# Patient Record
Sex: FEMALE | Race: WHITE | Employment: OTHER | URBAN - METROPOLITAN AREA
[De-identification: names, ages, dates, MRNs, and addresses within clinical notes are randomized per-mention and may not be internally consistent; named-entity substitution may affect disease eponyms.]

---

## 2020-01-01 ENCOUNTER — APPOINTMENT (OUTPATIENT)
Dept: GENERAL RADIOLOGY | Age: 80
End: 2020-01-01
Payer: OTHER MISCELLANEOUS

## 2020-01-01 ENCOUNTER — HOSPITAL ENCOUNTER (OUTPATIENT)
Age: 80
Discharge: HOME | End: 2020-05-08
Payer: MEDICARE

## 2020-01-01 ENCOUNTER — HOSPITAL ENCOUNTER (OUTPATIENT)
Dept: ULTRASOUND IMAGING | Age: 80
Discharge: HOME | End: 2020-05-08
Payer: MEDICARE

## 2020-01-01 ENCOUNTER — APPOINTMENT (OUTPATIENT)
Dept: ULTRASOUND IMAGING | Age: 80
End: 2020-01-01
Payer: OTHER MISCELLANEOUS

## 2020-01-01 ENCOUNTER — APPOINTMENT (OUTPATIENT)
Dept: CT IMAGING | Age: 80
End: 2020-01-01
Payer: OTHER MISCELLANEOUS

## 2020-01-01 ENCOUNTER — HOSPITAL ENCOUNTER (EMERGENCY)
Age: 80
Discharge: HOME | End: 2020-10-09
Attending: EMERGENCY MEDICINE
Payer: OTHER MISCELLANEOUS

## 2020-01-01 VITALS
RESPIRATION RATE: 16 BRPM | HEIGHT: 64 IN | OXYGEN SATURATION: 97 % | HEART RATE: 94 BPM | BODY MASS INDEX: 15.36 KG/M2 | WEIGHT: 90 LBS | SYSTOLIC BLOOD PRESSURE: 142 MMHG | DIASTOLIC BLOOD PRESSURE: 80 MMHG | TEMPERATURE: 98 F

## 2020-01-01 DIAGNOSIS — N39.0 URINARY TRACT INFECTION WITHOUT HEMATURIA, SITE UNSPECIFIED: Primary | ICD-10-CM

## 2020-01-01 DIAGNOSIS — R60.0 LOWER EXTREMITY EDEMA: ICD-10-CM
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
## 2020-01-01 LAB
ALBUMIN SERPL-MCNC: 4.3 G/DL (ref 3.5–5.2)
ALP BLD-CCNC: 121 U/L (ref 35–104)
ALT SERPL-CCNC: 13 U/L (ref 0–32)
ANION GAP SERPL CALCULATED.3IONS-SCNC: 11 MMOL/L (ref 7–16)
AST SERPL-CCNC: 30 U/L (ref 0–31)
BACTERIA: ABNORMAL /HPF
BASOPHILS ABSOLUTE: 0.04 E9/L (ref 0–0.2)
BASOPHILS RELATIVE PERCENT: 0.6 % (ref 0–2)
BILIRUB SERPL-MCNC: 0.5 MG/DL (ref 0–1.2)
BILIRUBIN URINE: NEGATIVE
BLOOD, URINE: ABNORMAL
BUN BLDV-MCNC: 20 MG/DL (ref 8–23)
CALCIUM SERPL-MCNC: 9.9 MG/DL (ref 8.6–10.2)
CHLORIDE BLD-SCNC: 102 MMOL/L (ref 98–107)
CLARITY: CLEAR
CO2: 26 MMOL/L (ref 22–29)
COLOR: YELLOW
CREAT SERPL-MCNC: 0.8 MG/DL (ref 0.5–1)
EOSINOPHILS ABSOLUTE: 0.12 E9/L (ref 0.05–0.5)
EOSINOPHILS RELATIVE PERCENT: 1.8 % (ref 0–6)
EPITHELIAL CELLS, UA: ABNORMAL /HPF
GFR AFRICAN AMERICAN: >60
GFR NON-AFRICAN AMERICAN: >60 ML/MIN/1.73
GLUCOSE BLD-MCNC: 91 MG/DL (ref 74–99)
GLUCOSE URINE: NEGATIVE MG/DL
HCT VFR BLD CALC: 38.9 % (ref 34–48)
HEMOGLOBIN: 12.4 G/DL (ref 11.5–15.5)
IMMATURE GRANULOCYTES #: 0.02 E9/L
IMMATURE GRANULOCYTES %: 0.3 % (ref 0–5)
KETONES, URINE: NEGATIVE MG/DL
LEUKOCYTE ESTERASE, URINE: ABNORMAL
LYMPHOCYTES ABSOLUTE: 1.45 E9/L (ref 1.5–4)
LYMPHOCYTES RELATIVE PERCENT: 21.9 % (ref 20–42)
MAGNESIUM: 2.3 MG/DL (ref 1.6–2.6)
MCH RBC QN AUTO: 29.9 PG (ref 26–35)
MCHC RBC AUTO-ENTMCNC: 31.9 % (ref 32–34.5)
MCV RBC AUTO: 93.7 FL (ref 80–99.9)
METER GLUCOSE: 87 MG/DL (ref 74–99)
MONOCYTES ABSOLUTE: 0.54 E9/L (ref 0.1–0.95)
MONOCYTES RELATIVE PERCENT: 8.1 % (ref 2–12)
NEUTROPHILS ABSOLUTE: 4.46 E9/L (ref 1.8–7.3)
NEUTROPHILS RELATIVE PERCENT: 67.3 % (ref 43–80)
NITRITE, URINE: POSITIVE
ORGANISM: ABNORMAL
PDW BLD-RTO: 14.6 FL (ref 11.5–15)
PH UA: 6 (ref 5–9)
PLATELET # BLD: 201 E9/L (ref 130–450)
PMV BLD AUTO: 10.5 FL (ref 7–12)
POTASSIUM SERPL-SCNC: 4.2 MMOL/L (ref 3.5–5)
PRO-BNP: 567 PG/ML (ref 0–450)
PROTEIN UA: NEGATIVE MG/DL
RBC # BLD: 4.15 E12/L (ref 3.5–5.5)
RBC UA: ABNORMAL /HPF (ref 0–2)
SODIUM BLD-SCNC: 139 MMOL/L (ref 132–146)
SPECIFIC GRAVITY UA: 1.02 (ref 1–1.03)
TOTAL PROTEIN: 7.2 G/DL (ref 6.4–8.3)
URINE CULTURE, ROUTINE: ABNORMAL
UROBILINOGEN, URINE: 0.2 E.U./DL
WBC # BLD: 6.6 E9/L (ref 4.5–11.5)
WBC UA: ABNORMAL /HPF (ref 0–5)

## 2020-01-01 PROCEDURE — 83735 ASSAY OF MAGNESIUM: CPT

## 2020-01-01 PROCEDURE — 85025 COMPLETE CBC W/AUTO DIFF WBC: CPT

## 2020-01-01 PROCEDURE — 93971 EXTREMITY STUDY: CPT

## 2020-01-01 PROCEDURE — 70450 CT HEAD/BRAIN W/O DYE: CPT

## 2020-01-01 PROCEDURE — 80053 COMPREHEN METABOLIC PANEL: CPT

## 2020-01-01 PROCEDURE — 71046 X-RAY EXAM CHEST 2 VIEWS: CPT

## 2020-01-01 PROCEDURE — 72125 CT NECK SPINE W/O DYE: CPT

## 2020-01-01 PROCEDURE — 81001 URINALYSIS AUTO W/SCOPE: CPT

## 2020-01-01 PROCEDURE — 87186 SC STD MICRODIL/AGAR DIL: CPT

## 2020-01-01 PROCEDURE — 99285 EMERGENCY DEPT VISIT HI MDM: CPT

## 2020-01-01 PROCEDURE — 6370000000 HC RX 637 (ALT 250 FOR IP): Performed by: NURSE PRACTITIONER

## 2020-01-01 PROCEDURE — 83880 ASSAY OF NATRIURETIC PEPTIDE: CPT

## 2020-01-01 PROCEDURE — 93971 EXTREMITY STUDY: CPT | Performed by: RADIOLOGY

## 2020-01-01 PROCEDURE — 87088 URINE BACTERIA CULTURE: CPT

## 2020-01-01 RX ORDER — CEPHALEXIN 500 MG/1
500 CAPSULE ORAL ONCE
Status: COMPLETED | OUTPATIENT
Start: 2020-01-01 | End: 2020-01-01

## 2020-01-01 RX ORDER — CEPHALEXIN 500 MG/1
500 CAPSULE ORAL 3 TIMES DAILY
Qty: 21 CAPSULE | Refills: 0 | Status: SHIPPED | OUTPATIENT
Start: 2020-01-01 | End: 2020-01-01

## 2020-01-01 RX ADMIN — CEPHALEXIN 500 MG: 500 CAPSULE ORAL at 18:40

## 2020-01-30 ENCOUNTER — HOSPITAL ENCOUNTER (OUTPATIENT)
Age: 80
Discharge: HOME | End: 2020-02-01
Payer: MEDICARE

## 2020-01-30 PROCEDURE — 88311 DECALCIFY TISSUE: CPT

## 2020-01-30 PROCEDURE — 88304 TISSUE EXAM BY PATHOLOGIST: CPT
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
## 2021-01-01 ENCOUNTER — HOSPITAL ENCOUNTER (EMERGENCY)
Age: 81
Discharge: NURSING, REHAB OR LONG-TERM CARE | End: 2021-03-30
Attending: EMERGENCY MEDICINE
Payer: MEDICARE

## 2021-01-01 ENCOUNTER — HOSPITAL ENCOUNTER (INPATIENT)
Age: 81
LOS: 1 days | Discharge: HOME | DRG: 689 | End: 2021-03-30
Attending: EMERGENCY MEDICINE | Admitting: INTERNAL MEDICINE
Payer: MEDICARE

## 2021-01-01 ENCOUNTER — APPOINTMENT (OUTPATIENT)
Dept: CT IMAGING | Age: 81
DRG: 689 | End: 2021-01-01
Payer: MEDICARE

## 2021-01-01 ENCOUNTER — APPOINTMENT (OUTPATIENT)
Dept: CT IMAGING | Age: 81
End: 2021-01-01
Payer: MEDICARE

## 2021-01-01 ENCOUNTER — APPOINTMENT (OUTPATIENT)
Dept: GENERAL RADIOLOGY | Age: 81
DRG: 689 | End: 2021-01-01
Payer: MEDICARE

## 2021-01-01 ENCOUNTER — HOSPITAL ENCOUNTER (EMERGENCY)
Age: 81
Discharge: HOME | End: 2021-01-28
Payer: MEDICARE

## 2021-01-01 VITALS
OXYGEN SATURATION: 100 % | HEART RATE: 99 BPM | BODY MASS INDEX: 17.48 KG/M2 | WEIGHT: 95 LBS | DIASTOLIC BLOOD PRESSURE: 81 MMHG | HEIGHT: 62 IN | RESPIRATION RATE: 18 BRPM | SYSTOLIC BLOOD PRESSURE: 154 MMHG

## 2021-01-01 VITALS
HEIGHT: 62 IN | DIASTOLIC BLOOD PRESSURE: 82 MMHG | TEMPERATURE: 96.8 F | BODY MASS INDEX: 16.93 KG/M2 | RESPIRATION RATE: 16 BRPM | HEART RATE: 100 BPM | OXYGEN SATURATION: 95 % | WEIGHT: 92 LBS | SYSTOLIC BLOOD PRESSURE: 151 MMHG

## 2021-01-01 VITALS
SYSTOLIC BLOOD PRESSURE: 118 MMHG | TEMPERATURE: 97.1 F | WEIGHT: 90 LBS | RESPIRATION RATE: 16 BRPM | DIASTOLIC BLOOD PRESSURE: 84 MMHG | OXYGEN SATURATION: 98 % | HEART RATE: 84 BPM | HEIGHT: 63 IN | BODY MASS INDEX: 15.95 KG/M2

## 2021-01-01 DIAGNOSIS — R53.1 GENERAL WEAKNESS: ICD-10-CM

## 2021-01-01 DIAGNOSIS — N30.00 ACUTE CYSTITIS WITHOUT HEMATURIA: ICD-10-CM
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
## 2021-01-01 DIAGNOSIS — S09.90XA MINOR HEAD INJURY, INITIAL ENCOUNTER: ICD-10-CM

## 2021-01-01 DIAGNOSIS — S01.81XA LACERATION OF FOREHEAD, INITIAL ENCOUNTER: Primary | ICD-10-CM

## 2021-01-01 DIAGNOSIS — R62.7 FAILURE TO THRIVE IN ADULT: Primary | ICD-10-CM
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
## 2021-01-01 LAB
ALBUMIN SERPL-MCNC: 3.9 G/DL (ref 3.5–5.2)
ALBUMIN SERPL-MCNC: 4 G/DL (ref 3.5–5.2)
ALBUMIN SERPL-MCNC: 4.3 G/DL (ref 3.5–5.2)
ALP BLD-CCNC: 118 U/L (ref 35–104)
ALP BLD-CCNC: 119 U/L (ref 35–104)
ALP BLD-CCNC: 123 U/L (ref 35–104)
ALT SERPL-CCNC: 15 U/L (ref 0–32)
ALT SERPL-CCNC: 17 U/L (ref 0–32)
ALT SERPL-CCNC: 18 U/L (ref 0–32)
ANION GAP SERPL CALCULATED.3IONS-SCNC: 12 MMOL/L (ref 7–16)
ANION GAP SERPL CALCULATED.3IONS-SCNC: 16 MMOL/L (ref 7–16)
ANION GAP SERPL CALCULATED.3IONS-SCNC: 9 MMOL/L (ref 7–16)
AST SERPL-CCNC: 27 U/L (ref 0–31)
AST SERPL-CCNC: 27 U/L (ref 0–31)
AST SERPL-CCNC: 31 U/L (ref 0–31)
BACTERIA: ABNORMAL /HPF
BASOPHILS ABSOLUTE: 0.04 E9/L (ref 0–0.2)
BASOPHILS ABSOLUTE: 0.05 E9/L (ref 0–0.2)
BASOPHILS RELATIVE PERCENT: 0.3 % (ref 0–2)
BASOPHILS RELATIVE PERCENT: 0.6 % (ref 0–2)
BILIRUB SERPL-MCNC: 0.2 MG/DL (ref 0–1.2)
BILIRUB SERPL-MCNC: 0.4 MG/DL (ref 0–1.2)
BILIRUB SERPL-MCNC: 0.4 MG/DL (ref 0–1.2)
BILIRUBIN URINE: NEGATIVE
BLOOD CULTURE, ROUTINE: NORMAL
BLOOD, URINE: ABNORMAL
BUN BLDV-MCNC: 15 MG/DL (ref 8–23)
BUN BLDV-MCNC: 19 MG/DL (ref 8–23)
BUN BLDV-MCNC: 22 MG/DL (ref 8–23)
CALCIUM SERPL-MCNC: 10.3 MG/DL (ref 8.6–10.2)
CALCIUM SERPL-MCNC: 9.3 MG/DL (ref 8.6–10.2)
CALCIUM SERPL-MCNC: 9.9 MG/DL (ref 8.6–10.2)
CHLORIDE BLD-SCNC: 97 MMOL/L (ref 98–107)
CHLORIDE BLD-SCNC: 99 MMOL/L (ref 98–107)
CHLORIDE BLD-SCNC: 99 MMOL/L (ref 98–107)
CLARITY: CLEAR
CO2: 22 MMOL/L (ref 22–29)
CO2: 22 MMOL/L (ref 22–29)
CO2: 28 MMOL/L (ref 22–29)
COLOR: YELLOW
CREAT SERPL-MCNC: 0.6 MG/DL (ref 0.5–1)
CREAT SERPL-MCNC: 0.8 MG/DL (ref 0.5–1)
CREAT SERPL-MCNC: 1.1 MG/DL (ref 0.5–1)
CULTURE, BLOOD 2: NORMAL
EKG ATRIAL RATE: 103 BPM
EKG P AXIS: 84 DEGREES
EKG P-R INTERVAL: 174 MS
EKG Q-T INTERVAL: 352 MS
EKG QRS DURATION: 60 MS
EKG QTC CALCULATION (BAZETT): 461 MS
EKG R AXIS: 11 DEGREES
EKG T AXIS: 78 DEGREES
EKG VENTRICULAR RATE: 103 BPM
EOSINOPHILS ABSOLUTE: 0.07 E9/L (ref 0.05–0.5)
EOSINOPHILS ABSOLUTE: 0.1 E9/L (ref 0.05–0.5)
EOSINOPHILS RELATIVE PERCENT: 0.5 % (ref 0–6)
EOSINOPHILS RELATIVE PERCENT: 1.1 % (ref 0–6)
EPITHELIAL CELLS, UA: ABNORMAL /HPF
GFR AFRICAN AMERICAN: 57
GFR AFRICAN AMERICAN: >60
GFR AFRICAN AMERICAN: >60
GFR NON-AFRICAN AMERICAN: 47 ML/MIN/1.73
GFR NON-AFRICAN AMERICAN: >60 ML/MIN/1.73
GFR NON-AFRICAN AMERICAN: >60 ML/MIN/1.73
GLUCOSE BLD-MCNC: 100 MG/DL (ref 74–99)
GLUCOSE BLD-MCNC: 106 MG/DL (ref 74–99)
GLUCOSE BLD-MCNC: 143 MG/DL (ref 74–99)
GLUCOSE URINE: NEGATIVE MG/DL
HCT VFR BLD CALC: 42 % (ref 34–48)
HCT VFR BLD CALC: 42.4 % (ref 34–48)
HCT VFR BLD CALC: 45.9 % (ref 34–48)
HEMOGLOBIN: 13.4 G/DL (ref 11.5–15.5)
HEMOGLOBIN: 13.4 G/DL (ref 11.5–15.5)
HEMOGLOBIN: 14.3 G/DL (ref 11.5–15.5)
IMMATURE GRANULOCYTES #: 0.03 E9/L
IMMATURE GRANULOCYTES #: 0.06 E9/L
IMMATURE GRANULOCYTES %: 0.3 % (ref 0–5)
IMMATURE GRANULOCYTES %: 0.4 % (ref 0–5)
KETONES, URINE: NEGATIVE MG/DL
LACTIC ACID, SEPSIS: 1.4 MMOL/L (ref 0.5–1.9)
LEUKOCYTE ESTERASE, URINE: ABNORMAL
LYMPHOCYTES ABSOLUTE: 0.75 E9/L (ref 1.5–4)
LYMPHOCYTES ABSOLUTE: 2.28 E9/L (ref 1.5–4)
LYMPHOCYTES RELATIVE PERCENT: 25.3 % (ref 20–42)
LYMPHOCYTES RELATIVE PERCENT: 5.5 % (ref 20–42)
MCH RBC QN AUTO: 29.4 PG (ref 26–35)
MCH RBC QN AUTO: 29.5 PG (ref 26–35)
MCH RBC QN AUTO: 29.9 PG (ref 26–35)
MCHC RBC AUTO-ENTMCNC: 31.2 % (ref 32–34.5)
MCHC RBC AUTO-ENTMCNC: 31.6 % (ref 32–34.5)
MCHC RBC AUTO-ENTMCNC: 31.9 % (ref 32–34.5)
MCV RBC AUTO: 93.4 FL (ref 80–99.9)
MCV RBC AUTO: 93.8 FL (ref 80–99.9)
MCV RBC AUTO: 94.4 FL (ref 80–99.9)
MONOCYTES ABSOLUTE: 0.77 E9/L (ref 0.1–0.95)
MONOCYTES ABSOLUTE: 0.83 E9/L (ref 0.1–0.95)
MONOCYTES RELATIVE PERCENT: 5.7 % (ref 2–12)
MONOCYTES RELATIVE PERCENT: 9.2 % (ref 2–12)
NEUTROPHILS ABSOLUTE: 11.91 E9/L (ref 1.8–7.3)
NEUTROPHILS ABSOLUTE: 5.71 E9/L (ref 1.8–7.3)
NEUTROPHILS RELATIVE PERCENT: 63.5 % (ref 43–80)
NEUTROPHILS RELATIVE PERCENT: 87.6 % (ref 43–80)
NITRITE, URINE: NEGATIVE
PDW BLD-RTO: 14.3 FL (ref 11.5–15)
PDW BLD-RTO: 14.4 FL (ref 11.5–15)
PDW BLD-RTO: 14.4 FL (ref 11.5–15)
PH UA: 6.5 (ref 5–9)
PLATELET # BLD: 258 E9/L (ref 130–450)
PLATELET # BLD: 260 E9/L (ref 130–450)
PLATELET # BLD: 266 E9/L (ref 130–450)
PMV BLD AUTO: 10.5 FL (ref 7–12)
PMV BLD AUTO: 9.4 FL (ref 7–12)
PMV BLD AUTO: 9.5 FL (ref 7–12)
POTASSIUM REFLEX MAGNESIUM: 4.9 MMOL/L (ref 3.5–5)
POTASSIUM REFLEX MAGNESIUM: 5 MMOL/L (ref 3.5–5)
POTASSIUM SERPL-SCNC: 4.8 MMOL/L (ref 3.5–5)
PROTEIN UA: NEGATIVE MG/DL
RBC # BLD: 4.48 E12/L (ref 3.5–5.5)
RBC # BLD: 4.54 E12/L (ref 3.5–5.5)
RBC # BLD: 4.86 E12/L (ref 3.5–5.5)
RBC UA: ABNORMAL /HPF (ref 0–2)
RENAL EPITHELIAL, UA: ABNORMAL /HPF
SARS-COV-2, NAAT: NOT DETECTED
SARS-COV-2, NAAT: NOT DETECTED
SODIUM BLD-SCNC: 133 MMOL/L (ref 132–146)
SODIUM BLD-SCNC: 134 MMOL/L (ref 132–146)
SODIUM BLD-SCNC: 137 MMOL/L (ref 132–146)
SPECIFIC GRAVITY UA: >=1.03 (ref 1–1.03)
TOTAL CK: 305 U/L (ref 20–180)
TOTAL PROTEIN: 7 G/DL (ref 6.4–8.3)
TOTAL PROTEIN: 7.5 G/DL (ref 6.4–8.3)
TOTAL PROTEIN: 7.7 G/DL (ref 6.4–8.3)
TROPONIN: <0.01 NG/ML (ref 0–0.03)
UROBILINOGEN, URINE: 0.2 E.U./DL
WBC # BLD: 13.6 E9/L (ref 4.5–11.5)
WBC # BLD: 9 E9/L (ref 4.5–11.5)
WBC # BLD: 9.2 E9/L (ref 4.5–11.5)
WBC UA: ABNORMAL /HPF (ref 0–5)

## 2021-01-01 PROCEDURE — 2580000003 HC RX 258: Performed by: STUDENT IN AN ORGANIZED HEALTH CARE EDUCATION/TRAINING PROGRAM

## 2021-01-01 PROCEDURE — 6360000002 HC RX W HCPCS: Performed by: NURSE PRACTITIONER

## 2021-01-01 PROCEDURE — 90471 IMMUNIZATION ADMIN: CPT | Performed by: NURSE PRACTITIONER

## 2021-01-01 PROCEDURE — 82550 ASSAY OF CK (CPK): CPT

## 2021-01-01 PROCEDURE — G0008 ADMIN INFLUENZA VIRUS VAC: HCPCS | Performed by: INTERNAL MEDICINE

## 2021-01-01 PROCEDURE — 80053 COMPREHEN METABOLIC PANEL: CPT

## 2021-01-01 PROCEDURE — 97161 PT EVAL LOW COMPLEX 20 MIN: CPT | Performed by: PHYSICAL THERAPIST

## 2021-01-01 PROCEDURE — 97530 THERAPEUTIC ACTIVITIES: CPT

## 2021-01-01 PROCEDURE — 99283 EMERGENCY DEPT VISIT LOW MDM: CPT

## 2021-01-01 PROCEDURE — 6370000000 HC RX 637 (ALT 250 FOR IP): Performed by: NEUROMUSCULOSKELETAL MEDICINE & OMM

## 2021-01-01 PROCEDURE — 93005 ELECTROCARDIOGRAM TRACING: CPT | Performed by: STUDENT IN AN ORGANIZED HEALTH CARE EDUCATION/TRAINING PROGRAM

## 2021-01-01 PROCEDURE — 90715 TDAP VACCINE 7 YRS/> IM: CPT | Performed by: NURSE PRACTITIONER

## 2021-01-01 PROCEDURE — 85027 COMPLETE CBC AUTOMATED: CPT

## 2021-01-01 PROCEDURE — 36415 COLL VENOUS BLD VENIPUNCTURE: CPT

## 2021-01-01 PROCEDURE — 87635 SARS-COV-2 COVID-19 AMP PRB: CPT

## 2021-01-01 PROCEDURE — 70450 CT HEAD/BRAIN W/O DYE: CPT

## 2021-01-01 PROCEDURE — 81001 URINALYSIS AUTO W/SCOPE: CPT

## 2021-01-01 PROCEDURE — 6370000000 HC RX 637 (ALT 250 FOR IP): Performed by: STUDENT IN AN ORGANIZED HEALTH CARE EDUCATION/TRAINING PROGRAM

## 2021-01-01 PROCEDURE — 97165 OT EVAL LOW COMPLEX 30 MIN: CPT

## 2021-01-01 PROCEDURE — 6360000002 HC RX W HCPCS: Performed by: INTERNAL MEDICINE

## 2021-01-01 PROCEDURE — 99231 SBSQ HOSP IP/OBS SF/LOW 25: CPT | Performed by: NURSE PRACTITIONER

## 2021-01-01 PROCEDURE — G0378 HOSPITAL OBSERVATION PER HR: HCPCS

## 2021-01-01 PROCEDURE — 99221 1ST HOSP IP/OBS SF/LOW 40: CPT | Performed by: PHYSICIAN ASSISTANT

## 2021-01-01 PROCEDURE — 2500000003 HC RX 250 WO HCPCS: Performed by: NURSE PRACTITIONER

## 2021-01-01 PROCEDURE — 2580000003 HC RX 258: Performed by: NEUROMUSCULOSKELETAL MEDICINE & OMM

## 2021-01-01 PROCEDURE — 73521 X-RAY EXAM HIPS BI 2 VIEWS: CPT

## 2021-01-01 PROCEDURE — 1200000000 HC SEMI PRIVATE

## 2021-01-01 PROCEDURE — 99232 SBSQ HOSP IP/OBS MODERATE 35: CPT | Performed by: NURSE PRACTITIONER

## 2021-01-01 PROCEDURE — 83605 ASSAY OF LACTIC ACID: CPT

## 2021-01-01 PROCEDURE — 96376 TX/PRO/DX INJ SAME DRUG ADON: CPT

## 2021-01-01 PROCEDURE — 6360000002 HC RX W HCPCS: Performed by: NEUROMUSCULOSKELETAL MEDICINE & OMM

## 2021-01-01 PROCEDURE — 97530 THERAPEUTIC ACTIVITIES: CPT | Performed by: PHYSICAL THERAPIST

## 2021-01-01 PROCEDURE — 85025 COMPLETE CBC W/AUTO DIFF WBC: CPT

## 2021-01-01 PROCEDURE — 99284 EMERGENCY DEPT VISIT MOD MDM: CPT

## 2021-01-01 PROCEDURE — 84484 ASSAY OF TROPONIN QUANT: CPT

## 2021-01-01 PROCEDURE — 90694 VACC AIIV4 NO PRSRV 0.5ML IM: CPT | Performed by: INTERNAL MEDICINE

## 2021-01-01 PROCEDURE — 6360000002 HC RX W HCPCS: Performed by: STUDENT IN AN ORGANIZED HEALTH CARE EDUCATION/TRAINING PROGRAM

## 2021-01-01 PROCEDURE — 96374 THER/PROPH/DIAG INJ IV PUSH: CPT

## 2021-01-01 PROCEDURE — 97535 SELF CARE MNGMENT TRAINING: CPT

## 2021-01-01 PROCEDURE — 87040 BLOOD CULTURE FOR BACTERIA: CPT

## 2021-01-01 PROCEDURE — 12013 RPR F/E/E/N/L/M 2.6-5.0 CM: CPT

## 2021-01-01 PROCEDURE — 51701 INSERT BLADDER CATHETER: CPT

## 2021-01-01 PROCEDURE — 93010 ELECTROCARDIOGRAM REPORT: CPT | Performed by: INTERNAL MEDICINE

## 2021-01-01 RX ORDER — SODIUM CHLORIDE 0.9 % (FLUSH) 0.9 %
10 SYRINGE (ML) INJECTION EVERY 12 HOURS SCHEDULED
Status: DISCONTINUED | OUTPATIENT
Start: 2021-01-01 | End: 2021-01-01 | Stop reason: HOSPADM

## 2021-01-01 RX ORDER — ACETAMINOPHEN 325 MG/1
650 TABLET ORAL EVERY 6 HOURS PRN
Status: DISCONTINUED | OUTPATIENT
Start: 2021-01-01 | End: 2021-01-01 | Stop reason: HOSPADM

## 2021-01-01 RX ORDER — ALPRAZOLAM 0.25 MG/1
0.25 TABLET ORAL 2 TIMES DAILY
Status: DISCONTINUED | OUTPATIENT
Start: 2021-01-01 | End: 2021-01-01 | Stop reason: HOSPADM

## 2021-01-01 RX ORDER — LIDOCAINE HYDROCHLORIDE 10 MG/ML
5 INJECTION, SOLUTION INFILTRATION; PERINEURAL ONCE
Status: COMPLETED | OUTPATIENT
Start: 2021-01-01 | End: 2021-01-01

## 2021-01-01 RX ORDER — CEPHALEXIN 500 MG/1
500 CAPSULE ORAL 3 TIMES DAILY
Qty: 15 CAPSULE | Refills: 0 | Status: SHIPPED | OUTPATIENT
Start: 2021-01-01 | End: 2021-01-01

## 2021-01-01 RX ORDER — PROMETHAZINE HYDROCHLORIDE 25 MG/1
12.5 TABLET ORAL EVERY 6 HOURS PRN
Status: DISCONTINUED | OUTPATIENT
Start: 2021-01-01 | End: 2021-01-01 | Stop reason: HOSPADM

## 2021-01-01 RX ORDER — ACETAMINOPHEN 650 MG/1
650 SUPPOSITORY RECTAL EVERY 6 HOURS PRN
Status: DISCONTINUED | OUTPATIENT
Start: 2021-01-01 | End: 2021-01-01 | Stop reason: HOSPADM

## 2021-01-01 RX ORDER — SODIUM CHLORIDE 0.9 % (FLUSH) 0.9 %
10 SYRINGE (ML) INJECTION PRN
Status: DISCONTINUED | OUTPATIENT
Start: 2021-01-01 | End: 2021-01-01 | Stop reason: HOSPADM

## 2021-01-01 RX ORDER — ACETAMINOPHEN 500 MG
1000 TABLET ORAL EVERY 6 HOURS PRN
Qty: 20 TABLET | Refills: 0 | Status: SHIPPED | OUTPATIENT
Start: 2021-01-01 | End: 2021-01-01

## 2021-01-01 RX ORDER — ALPRAZOLAM 0.25 MG/1
0.25 TABLET ORAL 2 TIMES DAILY
COMMUNITY

## 2021-01-01 RX ORDER — 0.9 % SODIUM CHLORIDE 0.9 %
500 INTRAVENOUS SOLUTION INTRAVENOUS ONCE
Status: COMPLETED | OUTPATIENT
Start: 2021-01-01 | End: 2021-01-01

## 2021-01-01 RX ORDER — ACETAMINOPHEN 500 MG
1000 TABLET ORAL EVERY 6 HOURS PRN
Qty: 20 TABLET | Refills: 0 | Status: SHIPPED | OUTPATIENT
Start: 2021-01-01 | End: 2021-01-01 | Stop reason: HOSPADM

## 2021-01-01 RX ORDER — PRAVASTATIN SODIUM 20 MG
10 TABLET ORAL DAILY
Status: DISCONTINUED | OUTPATIENT
Start: 2021-01-01 | End: 2021-01-01 | Stop reason: HOSPADM

## 2021-01-01 RX ORDER — SODIUM CHLORIDE 9 MG/ML
25 INJECTION, SOLUTION INTRAVENOUS PRN
Status: DISCONTINUED | OUTPATIENT
Start: 2021-01-01 | End: 2021-01-01 | Stop reason: HOSPADM

## 2021-01-01 RX ORDER — DIAPER,BRIEF,INFANT-TODD,DISP
EACH MISCELLANEOUS
Status: DISCONTINUED
Start: 2021-01-01 | End: 2021-01-01 | Stop reason: HOSPADM

## 2021-01-01 RX ORDER — BACITRACIN ZINC AND POLYMYXIN B SULFATE 500; 1000 [USP'U]/G; [USP'U]/G
OINTMENT TOPICAL
Qty: 30 G | Refills: 0 | Status: SHIPPED | OUTPATIENT
Start: 2021-01-01 | End: 2021-01-01

## 2021-01-01 RX ORDER — SODIUM CHLORIDE 9 MG/ML
INJECTION, SOLUTION INTRAVENOUS CONTINUOUS
Status: DISCONTINUED | OUTPATIENT
Start: 2021-01-01 | End: 2021-01-01 | Stop reason: HOSPADM

## 2021-01-01 RX ORDER — ONDANSETRON 2 MG/ML
4 INJECTION INTRAMUSCULAR; INTRAVENOUS EVERY 6 HOURS PRN
Status: DISCONTINUED | OUTPATIENT
Start: 2021-01-01 | End: 2021-01-01 | Stop reason: HOSPADM

## 2021-01-01 RX ORDER — POLYETHYLENE GLYCOL 3350 17 G/17G
17 POWDER, FOR SOLUTION ORAL DAILY PRN
Status: DISCONTINUED | OUTPATIENT
Start: 2021-01-01 | End: 2021-01-01 | Stop reason: HOSPADM

## 2021-01-01 RX ORDER — 0.9 % SODIUM CHLORIDE 0.9 %
1000 INTRAVENOUS SOLUTION INTRAVENOUS ONCE
Status: COMPLETED | OUTPATIENT
Start: 2021-01-01 | End: 2021-01-01

## 2021-01-01 RX ADMIN — PRAVASTATIN SODIUM 10 MG: 20 TABLET ORAL at 09:38

## 2021-01-01 RX ADMIN — A/SINGAPORE/GP1908/2015 IVR-180 (AN A/MICHIGAN/45/2015 (H1N1)PDM09-LIKE VIRUS, A/HONG KONG/4801/2014, NYMC X-263B (H3N2) (AN A/HONG KONG/4801/2014-LIKE VIRUS), AND B/BRISBANE/60/2008, WILD TYPE (A B/BRISBANE/60/2008-LIKE VIRUS) 0.5 ML: 15; 15; 15 INJECTION, SUSPENSION INTRAMUSCULAR at 11:38

## 2021-01-01 RX ADMIN — LIDOCAINE HYDROCHLORIDE 5 ML: 10 INJECTION, SOLUTION INFILTRATION; PERINEURAL at 14:37

## 2021-01-01 RX ADMIN — ALPRAZOLAM 0.25 MG: 0.25 TABLET ORAL at 21:59

## 2021-01-01 RX ADMIN — SODIUM CHLORIDE 500 ML: 9 INJECTION, SOLUTION INTRAVENOUS at 09:40

## 2021-01-01 RX ADMIN — Medication 10 ML: at 09:33

## 2021-01-01 RX ADMIN — TETANUS TOXOID, REDUCED DIPHTHERIA TOXOID AND ACELLULAR PERTUSSIS VACCINE, ADSORBED 0.5 ML: 5; 2.5; 8; 8; 2.5 SUSPENSION INTRAMUSCULAR at 14:36

## 2021-01-01 RX ADMIN — ALPRAZOLAM 0.25 MG: 0.25 TABLET ORAL at 09:33

## 2021-01-01 RX ADMIN — SODIUM CHLORIDE 1000 ML: 9 INJECTION, SOLUTION INTRAVENOUS at 16:03

## 2021-01-01 RX ADMIN — CEFTRIAXONE SODIUM 1000 MG: 1 INJECTION, POWDER, FOR SOLUTION INTRAMUSCULAR; INTRAVENOUS at 16:11

## 2021-01-01 RX ADMIN — ALPRAZOLAM 0.25 MG: 0.25 TABLET ORAL at 09:21

## 2021-01-01 RX ADMIN — PRAVASTATIN SODIUM 10 MG: 20 TABLET ORAL at 09:21

## 2021-01-01 RX ADMIN — ALPRAZOLAM 0.25 MG: 0.25 TABLET ORAL at 20:50

## 2021-01-01 RX ADMIN — Medication 10 ML: at 23:03

## 2021-01-01 RX ADMIN — SODIUM CHLORIDE: 9 INJECTION, SOLUTION INTRAVENOUS at 23:03

## 2021-01-01 RX ADMIN — CEFTRIAXONE SODIUM 1000 MG: 1 INJECTION, POWDER, FOR SOLUTION INTRAMUSCULAR; INTRAVENOUS at 16:57

## 2021-01-01 RX ADMIN — ACETAMINOPHEN 650 MG: 325 TABLET ORAL at 16:43

## 2021-01-01 RX ADMIN — ALPRAZOLAM 0.25 MG: 0.25 TABLET ORAL at 09:38

## 2021-01-01 RX ADMIN — PRAVASTATIN SODIUM 10 MG: 20 TABLET ORAL at 09:33

## 2021-01-01 RX ADMIN — ACETAMINOPHEN 650 MG: 325 TABLET ORAL at 06:49

## 2021-01-01 RX ADMIN — ACETAMINOPHEN 650 MG: 325 TABLET ORAL at 10:03

## 2021-01-01 RX ADMIN — Medication 10 ML: at 22:00

## 2021-01-01 RX ADMIN — ACETAMINOPHEN 650 MG: 325 TABLET ORAL at 17:02

## 2021-01-01 ASSESSMENT — PAIN SCALES - GENERAL
PAINLEVEL_OUTOF10: 0
PAINLEVEL_OUTOF10: 9
PAINLEVEL_OUTOF10: 7
PAINLEVEL_OUTOF10: 8
PAINLEVEL_OUTOF10: 0
PAINLEVEL_OUTOF10: 3
PAINLEVEL_OUTOF10: 8

## 2021-01-01 ASSESSMENT — PAIN DESCRIPTION - DESCRIPTORS
DESCRIPTORS: NUMBNESS;ACHING;DULL
DESCRIPTORS: NUMBNESS

## 2021-01-01 ASSESSMENT — PAIN DESCRIPTION - LOCATION
LOCATION: BACK
LOCATION: HEAD

## 2021-01-01 ASSESSMENT — ENCOUNTER SYMPTOMS
EYE PAIN: 0
COUGH: 0
SHORTNESS OF BREATH: 0
SORE THROAT: 0
DIARRHEA: 0
WHEEZING: 0
EYE DISCHARGE: 0
ABDOMINAL DISTENTION: 0
ABDOMINAL PAIN: 0
NAUSEA: 0
SHORTNESS OF BREATH: 0
BACK PAIN: 0
WHEEZING: 0
ABDOMINAL DISTENTION: 0
VOMITING: 0
SINUS PRESSURE: 0
BACK PAIN: 0
COUGH: 0
EYE REDNESS: 0

## 2021-01-01 ASSESSMENT — PAIN DESCRIPTION - PAIN TYPE
TYPE: ACUTE PAIN
TYPE: CHRONIC PAIN
TYPE: CHRONIC PAIN

## 2021-01-01 ASSESSMENT — PAIN DESCRIPTION - ORIENTATION
ORIENTATION: LEFT;RIGHT
ORIENTATION: RIGHT;LEFT

## 2021-01-01 ASSESSMENT — PAIN DESCRIPTION - FREQUENCY: FREQUENCY: INTERMITTENT

## 2021-01-28 NOTE — ED NOTES
Assisted with sutures, cleansed, applied bacitracin, non ad gauze to forehead.       Polly Hanley  01/28/21 1752
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
## 2021-03-27 PROBLEM — N39.0 UTI (URINARY TRACT INFECTION): Status: ACTIVE | Noted: 2021-01-01
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
## 2021-03-27 NOTE — ED NOTES
PT'S GRAND-DAUGHTER MARCIO LARSON [phone] LIVES WITH PT AND CARES FOR HER NEEDS.      PATRICIA Kelly  03/27/21 5182

## 2021-03-27 NOTE — ED PROVIDER NOTES
[facility] Name: Jose Heredia  MRN: [id number]  [place] 1940  Date of evaluation: 3/27/2021      CHIEF COMPLAINT       Chief Complaint   Patient presents with    Fall     hx of Polio, family left for [place] yesterday, she fell at 0600 this morning and hit her button for EMS to arrive d/t her being too weak to get up. She states she has been having numbness in her legs for the past couple of months. -loc -thinners        HPI  Jose Heredia is a 78 y.o. female with past medical history of polio and hyperlipidemia presents for weakness at home. She states that she felt weak and fell down from standing. Did not lose consciousness. Just that felt like her weakness got weak and she fell down. She could not get up. Called EMS for assistance. Usually uses a walker to ambulate. She is complaining of leg weakness. No alleviating exacerbating factors. States is never happened before. Admits to dysuria. She denies any medication measures alleviate the symptoms. Denies any recent fevers, chills, nausea, vomiting, chest pain, shortness of breath, abdominal pain, flank pain, dysuria, hematuria, diarrhea, constipation, new rashes or sores. Except as noted above the remainder of the review of systems was reviewed and negative. Review of Systems   Constitutional: Negative for chills and fever. HENT: Negative for ear pain, sinus pressure and sore throat. Eyes: Negative for pain, discharge and redness. Respiratory: Negative for cough, shortness of breath and wheezing. Cardiovascular: Negative for chest pain. Gastrointestinal: Negative for abdominal distention, diarrhea, nausea and vomiting. Genitourinary: Positive for dysuria. Negative for frequency. Musculoskeletal: Negative for arthralgias and back pain. Skin: Negative for rash and wound. Neurological: Positive for weakness. Negative for dizziness and headaches.    Hematological: Negative for adenopathy. Does not bruise/bleed easily. Psychiatric/Behavioral: Negative for agitation. All other systems reviewed and are negative. Physical Exam  Vitals signs and nursing note reviewed. Constitutional:       General: She is not in acute distress. Appearance: Normal appearance. She is well-developed. She is not ill-appearing or diaphoretic. Comments: Thin, pleasant, in no acute separate   HENT:      Head: Normocephalic and atraumatic. Eyes:      Extraocular Movements: Extraocular movements intact. Pupils: Pupils are equal, round, and reactive to light. Neck:      Musculoskeletal: Normal range of motion. Cardiovascular:      Rate and Rhythm: Normal rate and regular rhythm. Pulses: Normal pulses. Heart sounds: Normal heart sounds. No murmur. No friction rub. No gallop. Pulmonary:      Effort: Pulmonary effort is normal. No respiratory distress. Breath sounds: Normal breath sounds. No stridor. No wheezing, rhonchi or rales. Chest:      Chest wall: No tenderness. Abdominal:      General: Abdomen is flat. Bowel sounds are normal.      Palpations: Abdomen is soft. Tenderness: There is no abdominal tenderness. There is no right CVA tenderness, left CVA tenderness, guarding or rebound. Musculoskeletal: Normal range of motion. Skin:     General: Skin is warm and dry. Capillary Refill: Capillary refill takes less than 2 seconds. Neurological:      General: No focal deficit present. Mental Status: She is alert and oriented to person, place, and time. Cranial Nerves: No cranial nerve deficit. Sensory: No sensory deficit.       Coordination: Coordination normal.   Psychiatric:         Mood and Affect: Mood normal.          Procedures     MDM  Number of Diagnoses or Management Options  Acute cystitis without hematuria  Fall, initial encounter  General weakness  Diagnosis management comments: 13-year-old female with a past medical history of polio on hospice for failure to thrive presents with fall and weakness at home. Patient states that she just collapsed because her legs were weak. And could not pick herself up off the floor. Vitals within normal limits. On physical exam patient is no acute distress, speaking full sounds, alert and oriented x 3. Diagnostic labs and imaging interpreted and reviewed. Patient had no acute findings other than a urinary tract infection possibly causing her weakness. She was given Rocephin in the department. There has been issues with patient's disposition. Nobody is at home to take care of the patient at this time whether it be family or her hospice center. I believe the patient is not safe to go home by herself without care. Spoke with multiple family members as well as [facility] with the plan being patient received treatment for her urinary tract infection, hospice will resend their hospice care, and the patient to be picked up by her granddaughter in the morning after treatment. ED Course as of Mar 29 1532   Sat Mar 27, 2021   186 47-year-old female with no pertinent past medical history presents with weakness and fall at home. She states that she uses a walker at times to ambulate but only for safety. States that today her legs got weak and she just collapsed on the ground. Did not fall forward or backwards or hit her head. States that she felt too weak to get up. No alleviating exacerbating factors. Is not complaining of any pain of her legs. But was unable to stand up and had to call EMS for assistance. States that her  just passed away last month after very  for 79 years. [JV]   1401 Social work states that the patient has a lifting granddaughter who is not there today but will be. Plus has daily home health aides as she is on hospice. In addition she will be going to [place] with her family in the next couple days.     [JV]   1848 Spoke with patient's daughter-in-law via palpation. Follows with [facility]. States that they will not be able to give the patient 24-hour care at this time. [JV]   1628 I called [facility]. Will be calling back about patient disposition. [JV]   8289 Spoke with [facility]. They state that they will rescinded the hospice care and patient can be admitted to the hospital on observation for urinary tract infection. A call back will be made to Dr. Vikki Avitia for admission for patient. [JV]   1752 Patient signed out to Dr. Raymundo Sanabria For patient's final disposition to be admitted to the hospital for observation for urinary tract infection after her hospice care is rescinded. [JV]   [place] 36 Consulted with Dr. Vikki Avitia, covering for Dr. Williams Pichardo who accepted for admission. [KG]      ED Course User Index  [JV] Jenny Childs MD  [KG] Makayla Hussein, DO             --------------------------------------------- PAST HISTORY ---------------------------------------------  Past Medical History:  has a past medical history of Hyperlipidemia and Polio. Past Surgical History:  has no past surgical history on file. Social History:  reports that she has been smoking. She has never used smokeless tobacco. She reports that she does not drink alcohol or use drugs. Family History: family history is not on file. The patients home medications have been reviewed. Allergies: Patient has no known allergies.     -------------------------------------------------- RESULTS -------------------------------------------------    LABS:  Results for orders placed or performed during the hospital encounter of 03/27/21   COVID-19, Rapid    Specimen: Nasopharyngeal Swab   Result Value Ref Range    SARS-CoV-2, NAAT Not Detected Not Detected   Culture, Blood 1    Specimen: Blood   Result Value Ref Range    Blood Culture, Routine 24 Hours no growth    Culture, Blood 2    Specimen: Blood   Result Value Ref Range    Culture, Blood 2 24 Hours no growth    CBC Auto Differential   Result Value Ref Range    WBC 13.6 (H) 4.5 - 11.5 E9/L    RBC 4.54 3.50 - 5.50 E12/L    Hemoglobin 13.4 11.5 - 15.5 g/dL    Hematocrit 42.4 34.0 - 48.0 %    MCV 93.4 80.0 - 99.9 fL    MCH 29.5 26.0 - 35.0 pg    MCHC 31.6 (L) 32.0 - 34.5 %    RDW 14.3 11.5 - 15.0 fL    Platelets 951 798 - 154 E9/L    MPV 9.5 7.0 - 12.0 fL    Neutrophils % 87.6 (H) 43.0 - 80.0 %    Immature Granulocytes % 0.4 0.0 - 5.0 %    Lymphocytes % 5.5 (L) 20.0 - 42.0 %    Monocytes % 5.7 2.0 - 12.0 %    Eosinophils % 0.5 0.0 - 6.0 %    Basophils % 0.3 0.0 - 2.0 %    Neutrophils Absolute 11.91 (H) 1.80 - 7.30 E9/L    Immature Granulocytes # 0.06 E9/L    Lymphocytes Absolute 0.75 (L) 1.50 - 4.00 E9/L    Monocytes Absolute 0.77 0.10 - 0.95 E9/L    Eosinophils Absolute 0.07 0.05 - 0.50 E9/L    Basophils Absolute 0.04 0.00 - 0.20 E9/L   Comprehensive Metabolic Panel w/ Reflex to MG   Result Value Ref Range    Sodium 137 132 - 146 mmol/L    Potassium reflex Magnesium 5.0 3.5 - 5.0 mmol/L    Chloride 99 98 - 107 mmol/L    CO2 22 22 - 29 mmol/L    Anion Gap 16 7 - 16 mmol/L    Glucose 100 (H) 74 - 99 mg/dL    BUN 19 8 - 23 mg/dL    CREATININE 1.1 (H) 0.5 - 1.0 mg/dL    GFR Non-African American 47 >=60 mL/min/1.73    GFR African American 57     Calcium 9.9 8.6 - 10.2 mg/dL    Total Protein 7.5 6.4 - 8.3 g/dL    Albumin 4.3 3.5 - 5.2 g/dL    Total Bilirubin 0.2 0.0 - 1.2 mg/dL    Alkaline Phosphatase 119 (H) 35 - 104 U/L    ALT 15 0 - 32 U/L    AST 27 0 - 31 U/L   Troponin   Result Value Ref Range    Troponin <0.01 0.00 - 0.03 ng/mL   CK   Result Value Ref Range    Total  (H) 20 - 180 U/L   Lactate, Sepsis   Result Value Ref Range    Lactic Acid, Sepsis 1.4 0.5 - 1.9 mmol/L   Urinalysis, reflex to microscopic   Result Value Ref Range    Color, UA Yellow Straw/Yellow    Clarity, UA Clear Clear    Glucose, Ur Negative Negative mg/dL    Bilirubin Urine Negative Negative Ketones, Urine Negative Negative mg/dL    Specific Gravity, UA >=1.030 1.005 - 1.030    Blood, Urine TRACE-INTACT Negative    pH, UA 6.5 5.0 - 9.0    Protein, UA Negative Negative mg/dL    Urobilinogen, Urine 0.2 <2.0 E.U./dL    Nitrite, Urine Negative Negative    Leukocyte Esterase, Urine MODERATE (A) Negative   Microscopic Urinalysis   Result Value Ref Range    WBC, UA 10-20 (A) 0 - 5 /HPF    RBC, UA 2-5 0 - 2 /HPF    Epithelial Cells, UA MODERATE /HPF    Renal Epithelial, UA RARE /HPF    Bacteria, UA MANY (A) None Seen /HPF   CBC   Result Value Ref Range    WBC 9.2 4.5 - 11.5 E9/L    RBC 4.48 3.50 - 5.50 E12/L    Hemoglobin 13.4 11.5 - 15.5 g/dL    Hematocrit 42.0 34.0 - 48.0 %    MCV 93.8 80.0 - 99.9 fL    MCH 29.9 26.0 - 35.0 pg    MCHC 31.9 (L) 32.0 - 34.5 %    RDW 14.4 11.5 - 15.0 fL    Platelets 422 448 - 511 E9/L    MPV 9.4 7.0 - 12.0 fL   COMPREHENSIVE METABOLIC PANEL   Result Value Ref Range    Sodium 133 132 - 146 mmol/L    Potassium 4.8 3.5 - 5.0 mmol/L    Chloride 99 98 - 107 mmol/L    CO2 22 22 - 29 mmol/L    Anion Gap 12 7 - 16 mmol/L    Glucose 143 (H) 74 - 99 mg/dL    BUN 22 8 - 23 mg/dL    CREATININE 0.8 0.5 - 1.0 mg/dL    GFR Non-African American >60 >=60 mL/min/1.73    GFR African American >60     Calcium 9.3 8.6 - 10.2 mg/dL    Total Protein 7.0 6.4 - 8.3 g/dL    Albumin 4.0 3.5 - 5.2 g/dL    Total Bilirubin 0.4 0.0 - 1.2 mg/dL    Alkaline Phosphatase 123 (H) 35 - 104 U/L    ALT 17 0 - 32 U/L    AST 31 0 - 31 U/L   EKG 12 Lead   Result Value Ref Range    Ventricular Rate 103 BPM    Atrial Rate 103 BPM    P-R Interval 174 ms    QRS Duration 60 ms    Q-T Interval 352 ms    QTc Calculation (Bazett) 461 ms    P Axis 84 degrees    R Axis 11 degrees    T Axis 78 degrees     EKG read and interpreted by me. Heart rate 103. Sinus tachycardia. Left axis deviation. No QTC elongation. No ST elevations or depressions. Stable as compared to previous EKG.     RADIOLOGY:  CT HEAD WO CONTRAST   Final Result   No acute intracranial abnormality. Periventricular white matter changes consistent chronic microvascular   disease. Diffuse volume loss. XR HIP BILATERAL W AP PELVIS (2 VIEWS)   Final Result   No acute osseous abnormality is identified. If there are persistent   symptoms, follow-up radiograph or CT could be obtained to exclude occult   fracture             ------------------------- NURSING NOTES AND VITALS REVIEWED ---------------------------  Date / Time Roomed:  3/27/2021  7:34 AM  ED Bed Assignment:  [id number]    The nursing notes within the ED encounter and vital signs as below have been reviewed. Patient Vitals for the past 24 hrs:   BP Temp Temp src Pulse Resp SpO2 Height   03/29/21 1343 -- -- -- -- -- -- 5' 2\" (1.575 m)   03/29/21 0915 (!) 144/83 97.3 °F (36.3 °C) Oral 84 18 -- --   03/28/21 2315 (!) 147/85 97.6 °F (36.4 °C) Temporal 89 18 100 % --       Oxygen Saturation Interpretation: Normal    ------------------------------------------ PROGRESS NOTES ------------------------------------------    Counseling:  I have spoken with the patient and discussed todays results, in addition to providing specific details for the plan of care and counseling regarding the diagnosis and prognosis. Their questions are answered at this time and they are agreeable with the plan of admission.    --------------------------------- ADDITIONAL PROVIDER NOTES ---------------------------------  Consultations:  Spoke with Dr. Alden Correia. Discussed case. They will admit the patient. This patient's ED course included: a personal history and physicial examination, re-evaluation prior to disposition, multiple bedside re-evaluations, IV medications, cardiac monitoring and continuous pulse oximetry    This patient has remained hemodynamically stable during their ED course. Diagnosis:  1. Fall, initial encounter    2. General weakness    3.  Acute cystitis without hematuria Disposition:  Patient's disposition: Admit to telemetry  Patient's condition is stable.         Karli Mcmillan MD  Resident  03/29/21 4443

## 2021-03-27 NOTE — ED NOTES
Bed: 14B-14  Expected date:   Expected time:   Means of arrival:   Comments:  ems     Esther Youngblood RN  03/27/21 0720

## 2021-03-28 NOTE — CONSULTS
Natacha Tripathi is a 78 y.o. female     Neurology was consulted for frequent falls     Past Medical History:     Past Medical History:   Diagnosis Date    Hyperlipidemia     Polio        Past Surgical History:     History reviewed. No pertinent surgical history. Allergies:     Patient has no known allergies. Medications:     Prior to Admission medications    Medication Sig Start Date End Date Taking? Authorizing Provider   ALPRAZolam (XANAX) 0.25 MG tablet Take 0.25 mg by mouth 2 times daily. Yes Historical Provider, MD   pravastatin (PRAVACHOL) 10 MG tablet Take 10 mg by mouth daily   Yes Historical Provider, MD       Social History:     Denies ETOH, tobacco, or illicit drugs    Review of Systems:     No chest pain or palpitations  No SOB  No vertigo, lightheadedness or loss of consciousness  No incontinence of bowels or bladder  No itching or bruising appreciated  + L side weakness  + falls     ROS is otherwise negative     Family History:     History reviewed. No pertinent family history. History of Present Illness: The patient presents after a fall at home. She has a history of polio with residual L spastic hemiparesis. Per patient, She has been falling frequently over the last 1.5 years. She has been living alone since her  passed away in 2/2021. Her family lives in [place] and the patient may be going down to stay with family in the next couple days. She has been receiving hospice care which was revoked for her to come to the hospital. + UTI on UA. Patient also reports she has not been eating or drinking much. Objective:     /75   Pulse 104   Temp 98.1 °F (36.7 °C) (Oral)   Resp 18   Ht 5' 2\" (1.575 m)   Wt 92 lb (41.7 kg)   SpO2 96%   BMI 16.83 kg/m²     General appearance: alert, appears stated age, ill appearing. Thin   Head: normocephalic, without obvious abnormality, atraumatic  Eyes: conjunctivae/corneas clear.  .  Neck: no adenopathy, no carotid bruit, supple, Component Value Date     03/28/2021    K 4.8 03/28/2021    K 5.0 03/27/2021    CL 99 03/28/2021    CO2 22 03/28/2021    BUN 22 03/28/2021    CREATININE 0.8 03/28/2021    GFRAA >60 03/28/2021    LABGLOM >60 03/28/2021    GLUCOSE 143 03/28/2021    PROT 7.0 03/28/2021    LABALBU 4.0 03/28/2021    CALCIUM 9.3 03/28/2021    BILITOT 0.4 03/28/2021    ALKPHOS 123 03/28/2021    AST 31 03/28/2021    ALT 17 03/28/2021     CTH 1/2021  No acute intracranial hemorrhage or edema. I independently reviewed the labs and imaging studies today    Assessment:     78year old woman with history of polio and chronic L spastic hemiparesis with frequent falls in the setting of UTI. 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 Current UTI and deconditioning/ failure to thrive likely contributing. She admits to not using an assistive device regularly at baseline which is contributing to the frequency of her falls. Plan:     CT head     PT/OT    Supportive care     Recommend establishing what patient and family goals of care are prior to doing additional neurologic workup. If patient is returning to hospice care, further workup may be inappropriate.     Pedro Carr PA-C  12:21 PM  3/28/2021

## 2021-03-28 NOTE — PLAN OF CARE
Problem: Falls - Risk of:  Goal: Will remain free from falls  Description: Will remain free from falls  3/28/2021 1144 by Kendall Melvin RN  Outcome: Met This Shift  3/28/2021 0120 by Bhargav Otoole RN  Outcome: Met This Shift

## 2021-03-28 NOTE — PROGRESS NOTES
Physical Therapy Initial Assessment     Name: Agnieszka Mccall  : 1940  MRN: [id number]    Referring Provider:  Shawanda Osorio DO    Date of Service: 3/28/2021    Evaluating PT:  Pricila Jerica, [place] [id number]    Room #:  9717/0072-J  Diagnosis:  UTI  PMHx/PSHx:  Polio (on hospice)  Procedure/Surgery:  N/A  Precautions:  Falls   Equipment Needs:  TBA    SUBJECTIVE:    Pt lives with granddaughter in a split level home with no stairs to enter and no rail. Bed is on 2nd floor and bath is on 2nd floor. Pt ambulated with no device PTA, h/o multiple falls. OBJECTIVE:   Initial Evaluation  Date: 3/28/21 Treatment Short Term/ Long Term   Goals   AM-PAC 6 Clicks      Was pt agreeable to Eval/treatment? yes     Does pt have pain? None reported     Bed Mobility  Rolling: min A  Supine to sit: min A  Sit to supine: min A  Scooting: min A  Rolling: mod independent  Supine to sit: mod independent  Sit to supine: mod independent   Scooting: mod independent   Transfers Sit to stand: mod A  Stand to sit: mod A  Stand pivot: mod A  Sit to stand: min A  Stand to sit: min A  Stand pivot: min A   Ambulation    2-3 feet with HHA mod A  50 feet with AAD Leno   Stair negotiation: ascended and descended  N/A  2-4 steps with 1 rail min A   ROM BUE:  Defer to OT  BLE:  WFL     Strength BUE:  Defer to OT  BLE:  4-/5  4/5   Balance Sitting EOB:  CGA  Dynamic Standing:   Mod A  Sitting EOB:  SBA  Dynamic Standing:  Min A     Pt is A & O x 3  Sensation:  Pt denies numbness and tingling to extremities  Edema:  None noted    Vitals:  Blood Pressure at rest  Blood Pressure post session    Heart Rate at rest  Heart Rate post session    SPO2 at rest  SPO2 post session      Therapeutic Exercises:  N/A    Patient education  Pt educated on safety with mobility    Patient response to education:   Pt verbalized understanding Pt demonstrated skill Pt requires further education in this area   Yes  Intermittently yes ASSESSMENT:    Comments:  Attempted use of wheeled walker, however, due to weakness, tone, and posturing of the L UE pt is unable to  a walker with her left hand. Pt reports that she occasionally uses a wheeled walker at home and  it by the front bar with the right hand. Pt demonstrating poor safety with this technique therefore opted for HHA instead. Pt demonstrated significant shuffling and difficulty advancing feet during gait. Pt fatigued quickly as a result and was unable to ambulate more than a few feet. Treatment:  Patient practiced and was instructed in the following treatment:     Pt performed functional activities as stated above    Pt's/ family goals   1. To return home    Patient and or family understand(s) diagnosis, prognosis, and plan of care. yes    PLAN OF CARE:    Current Treatment Recommendations     [x] Strengthening     [x] ROM   [x] Balance Training   [x] Endurance Training   [x] Transfer Training   [x] Gait Training   [x] Stair Training   [] Positioning   [x] Safety and Education Training   [x] Patient/Caregiver Education   [x] HEP  [] Other     Frequency of treatments: 2-5x/week x 1-2 weeks. Time in  0850  Time out  0908    Total Treatment Time  10 minutes     Evaluation Time includes thorough review of current medical information, gathering information on past medical history/social history and prior level of function, completion of standardized testing/informal observation of tasks, assessment of data and education on plan of care and goals.     CPT codes:  [x] Low Complexity PT evaluation 80021  [] Moderate Complexity PT evaluation 28784  [] High Complexity PT evaluation 44713  [] PT Re-evaluation 50474  [] Gait training 55059  minutes  [] Manual therapy 66113  minutes  [x] Therapeutic activities 92121 10 minutes  [] Therapeutic exercises 88528  minutes  [] Neuromuscular reeducation 25119  minutes     Shanika Dunne [facility] 372

## 2021-03-28 NOTE — H&P
[facility]                  [address] [facility],  [facility]                              HISTORY AND PHYSICAL    PATIENT NAME: Kim Newsome                     :        1940  MED REC NO:   [id number]                            ROOM:       8422  ACCOUNT NO:   [de-identified]                           ADMIT DATE: 2021  PROVIDER:     Maria A Shook DO    CHIEF COMPLAINT:  Weakness, frequent falls. HISTORY OF CHIEF COMPLAINT:  An 64-year-old female presents to emergency  room after falling at home. Unfortunately, the patient lives by herself  since the death of her  in early 2021. I was initially told  she is in hospice care which the family did revoke in order for her to  be admitted here for safety issues. The patient has a history of  hyperlipidemia and general anxiety disorder given her home medications. The patient was felt that she was unsafe to be remained at home by  herself since the passing of her . Family members are coming  today to possibly pick her up for her to be moved to the [place] area. She has a granddaughter coming from [address] [place] [address] per the patient's  account. The patient denies any fever, sweats, chills. States that she  does not have any history of cancers. She is not in any pain from her  fall. Denies neck or back pain. Denies currently any chest pain,  shortness of breath. Workup in the ER showed she had a metabolic panel which showed a  creatinine 1.1. Sugar is 100. Lactic acid 1.4. CK total 305. Troponin less than 0.01. Liver functions unremarkable except for an alk  phos of 119. White count was 13.6. She was afebrile. Urinalysis did  show moderate leukocytes, 10 to 20 wbc's, many bacteria. She did get a  bilateral hip x-ray which showed no acute osseous abnormalities  identified. The patient was given IV fluids and IV antibiotics for her  UTI in the ER.   She will be admitted under Dr. Carlos Press service  observation for UTI, weakness, and frequent falls. We will obtain a  [facility] Neurology consult for frequent falls and gait instability as well  as a PT and OT evaluation. We will start IV fluids and continue IV  Rocephin every day.  will be consulted too for discharge  planning. She offers no other complaints at this time. PAST MEDICAL HISTORY:  1. Hyperlipidemia. 2.  History of polio. 3.  General anxiety disorder. PAST SURGICAL HISTORY:  None noted. ALLERGIES:  No known drug allergies. MEDICATIONS:  Prior to admission, she was on Pravachol 10 mg one daily,  Xanax 0.5 mg one by mouth twice a day. SOCIAL HISTORY:  She smokes on occasion, frequency and quantity unknown. Denies alcohol use. Denies any illicit drug use. The patient denies  vaping. Occupation:  She is retired. Marital status:  She is, per her  account, is . FAMILY HISTORY:  Unknown. REVIEW OF SYSTEMS:  As mentioned in chief complaint, otherwise  unremarkable. PHYSICAL EXAMINATION:  VITAL SIGNS:  On admission, temperature 97.6, pulse 108, respirations  16, blood pressure 135/86, pulse ox 96% on room air. Height 5 feet 2  inches, weight 92 pounds, BMI 16.83. GENERAL:  Appears cooperative, pleasant, alert and oriented x3, in no  acute distress. SKIN:  Adequate turgor. No tenting, no rash or wounds. It is dry and  intact. HEENT:  Unremarkable. HEART:  Regular rate and rhythm without murmurs, rubs or gallops. LUNGS:  Clear to auscultation bilaterally. ABDOMEN:  Soft, nontender, nondistended. Good bowel sounds throughout. No masses, no organomegaly, no bruit. EXTREMITIES:  No clubbing, cyanosis, edema noted. NEUROLOGICAL:  Intact. No focal deficits. Cranial nerves II through  XII grossly intact. MUSCULOSKELETAL:  Appropriate for above-stated age. LABORATORY DATA:  Metabolic panel unremarkable except for creatinine of  1.1.   CK total 305, alk phos

## 2021-03-29 PROBLEM — E43 SEVERE PROTEIN-CALORIE MALNUTRITION (HCC): Status: ACTIVE | Noted: 2021-01-01

## 2021-03-29 PROBLEM — R62.7 FAILURE TO THRIVE IN ADULT: Status: ACTIVE | Noted: 2021-01-01

## 2021-03-29 NOTE — PLAN OF CARE
Problem: Falls - Risk of:  Goal: Will remain free from falls  Description: Will remain free from falls  3/29/2021 0035 by Dandy Frost RN  Outcome: Met This Shift  3/28/2021 1144 by Johnny Angel RN  Outcome: Met This Shift  Goal: Absence of physical injury  Description: Absence of physical injury  Outcome: Met This Shift

## 2021-03-29 NOTE — PLAN OF CARE
Problem: Falls - Risk of:  Goal: Will remain free from falls  Description: Will remain free from falls  3/29/2021 0036 by Shala Last RN  Outcome: Met This Shift  3/29/2021 0035 by Shala Last RN  Outcome: Met This Shift  3/28/2021 1144 by Shahnaz Holly RN  Outcome: Met This Shift  Goal: Absence of physical injury  Description: Absence of physical injury  3/29/2021 0036 by Shala Last RN  Outcome: Met This Shift  3/29/2021 0035 by Shala Last RN  Outcome: Met This Shift

## 2021-03-29 NOTE — PROGRESS NOTES
Sheela Wells is a 78 y.o. right handed female     Neurology is following for frequent falls    PMH: Polio with residual left-sided spastic weakness, hyperlipidemia    The patient presents after a fall at home. Per patient, has been falling frequently over the last 1.5 years. She has been living alone since her  passed away in 2/2021. Her family lives in [place] and the patient may be going down to stay with family in the next couple days. She has been receiving hospice care which was revoked for her to come to the hospital.    + UTI on UA. Patient also reports she has not been eating or drinking much. CT head showed no acute intracranial abnormality   At present time patient is sitting up in bed feeding herself, awake alert and oriented x4. Vital signs are stable at present time    No family at bedside    Patient has no complaints currently    Objective:     BP (!) 144/83   Pulse 84   Temp 97.3 °F (36.3 °C) (Oral)   Resp 18   Ht 5' 2\" (1.575 m)   Wt 92 lb (41.7 kg)   SpO2 100%   BMI 16.83 kg/m²     General appearance: alert, appears stated age, cooperative and no distress  Head: normocephalic, without obvious abnormality, atraumatic  Eyes: conjunctivae/corneas clear.  .  Neck: supple, symmetrical, trachea midline  Lungs: clear to auscultation bilaterally  Heart: regular rate and rhythm  Extremities: normal, atraumatic, no cyanosis or edema  Pulses: 2+ and symmetric  Skin: color, texture, turgor normal---no rashes or lesions      Mental Status: Alert and oriented x4, follows commands well, pleasant    Appropriate attention/concentration  Intact fundus of knowledge  Intact memories    Speech: Mild to moderate dysarthria  Language: no aphasias    Cranial Nerves:  I: smell NA   II: visual acuity  NA   II: visual fields Full to confrontation   II: pupils PERRL   III,VII: ptosis None   III,IV,VI: extraocular muscles  EOMI without nystagmus   V: mastication Normal   V: facial light touch

## 2021-03-29 NOTE — PROGRESS NOTES
Comprehensive Nutrition Assessment    Type and Reason for Visit:  Initial, Positive Nutrition Screen    Nutrition Recommendations/Plan: Continue current diet, Start Ensure BID    Nutrition Assessment:  Pt severely malnourished noted to be home w/ hospice services for unclear reasons. Pt now adm w/ UTI pending POC. Hx HLD, polio, frequent falls. Will provide ONS and monitor    Malnutrition Assessment:  Malnutrition Status:  Severe malnutrition    Context:  Chronic Illness     Findings of the 6 clinical characteristics of malnutrition:  Energy Intake:  7 - 75% or less estimated energy requirements for 1 month or longer  Weight Loss:  Unable to assess(d/t lack of hx on file)     Body Fat Loss:  7 - Severe body fat loss Orbital, Triceps, Fat Overlying Ribs   Muscle Mass Loss:  7 - Severe muscle mass loss Temples (temporalis), Clavicles (pectoralis & deltoids), Thigh (quadraceps), Calf (gastrocnemius)  Fluid Accumulation:  No significant fluid accumulation     Strength:  Not Performed    Estimated Daily Nutrient Needs:  Energy (kcal):  >1000; Weight Used for Energy Requirements:  Current     Protein (g):  60-70; Weight Used for Protein Requirements:  Current(1.5-1.8)        Fluid (ml/day):  >1000; Method Used for Fluid Requirements:  1 ml/kcal      Nutrition Related Findings:  pt alert, abd WDL, no noted edema, +I/Os, debility      Wounds:  None       Current Nutrition Therapies:    DIET GENERAL;     Anthropometric Measures:  · Height: 5' 2\" (157.5 cm)  · Current Body Weight: 92 lb (41.7 kg)(no method, UTO updated wt)   · Usual Body Weight: (90-95# stated)     · Ideal Body Weight: 110 lbs; % Ideal Body Weight 83.6 %   · BMI: 16.8    · BMI Categories: Underweight (BMI less than 22) age over 72       Nutrition Diagnosis:   · Severe malnutrition, In context of chronic illness related to catabolic illness as evidenced by intake 26-50%, poor intake prior to admission, severe loss of subcutaneous fat, severe muscle loss      Nutrition Interventions:   Food and/or Nutrient Delivery:  Continue Current Diet, Start Oral Nutrition Supplement(Ensure BID)  Nutrition Education/Counseling:  Education not indicated   Coordination of Nutrition Care:  Continue to monitor while inpatient    Goals:  Consume >50% meals/ONS       Nutrition Monitoring and Evaluation:   Food/Nutrient Intake Outcomes:  Diet Advancement/Tolerance, Food and Nutrient Intake, Supplement Intake  Physical Signs/Symptoms Outcomes:  Biochemical Data, GI Status, Fluid Status or Edema, Nutrition Focused Physical Findings, Skin, Weight     Discharge Planning:     Too soon to determine     Electronically signed by Nils Laurent, MS, RD, LD on 3/29/21 at 1:48 PM EDT    Contact: [phone]

## 2021-03-29 NOTE — PLAN OF CARE
Problem: Falls - Risk of:  Goal: Will remain free from falls  Description: Will remain free from falls  3/29/2021 1128 by José Miguel Galvin RN  Outcome: Met This Shift  3/29/2021 0036 by Margarita Cline RN  Outcome: Met This Shift  3/29/2021 0035 by Margarita Cline RN  Outcome: Met This Shift

## 2021-03-29 NOTE — PROGRESS NOTES
Admit Date: 3/27/2021    Subjective:     Week end event reviewed   NAD     Objective:     No data found. I/O last 3 completed shifts: In: 6244 [P.O.:120; I.V.:1300]  Out: 300 [Urine:300]  No intake/output data recorded. HEENT: Normal  NECK: Thyroid normal. No carotid bruit. No lymphphadenopathy. CVS: RRR  RS: Clear. No wheeze. No rhonchi. Good airflow bilaterally. ABD: Soft. Non tender. No mass. Normal BS. EXT: No edema. Non tender. Pulses present.    NEURO: left sided weakness       Scheduled Meds:   sodium chloride flush  10 mL Intravenous 2 times per day    influenza virus vaccine  0.5 mL Intramuscular Prior to discharge    ALPRAZolam  0.25 mg Oral BID    pravastatin  10 mg Oral Daily    cefTRIAXone (ROCEPHIN) IV  1,000 mg Intravenous Q24H     Continuous Infusions:   sodium chloride      sodium chloride 60 mL/hr at 03/27/21 2303       CBC with Differential:    Lab Results   Component Value Date    WBC 9.2 03/28/2021    RBC 4.48 03/28/2021    HGB 13.4 03/28/2021    HCT 42.0 03/28/2021     03/28/2021    MCV 93.8 03/28/2021    MCH 29.9 03/28/2021    MCHC 31.9 03/28/2021    RDW 14.4 03/28/2021    LYMPHOPCT 5.5 03/27/2021    MONOPCT 5.7 03/27/2021    BASOPCT 0.3 03/27/2021    MONOSABS 0.77 03/27/2021    LYMPHSABS 0.75 03/27/2021    EOSABS 0.07 03/27/2021    BASOSABS 0.04 03/27/2021     CMP:    Lab Results   Component Value Date     03/28/2021    K 4.8 03/28/2021    K 5.0 03/27/2021    CL 99 03/28/2021    CO2 22 03/28/2021    BUN 22 03/28/2021    CREATININE 0.8 03/28/2021    GFRAA >60 03/28/2021    LABGLOM >60 03/28/2021    PROT 7.0 03/28/2021    LABALBU 4.0 03/28/2021    CALCIUM 9.3 03/28/2021    BILITOT 0.4 03/28/2021    ALKPHOS 123 03/28/2021    AST 31 03/28/2021    ALT 17 03/28/2021     PT/INR:  No results found for: PROTIME, INR    Assessment:     Active Problems:    UTI (urinary tract infection)    Deconditioning     Failure to thrive     Anxiety       Plan:   Continue ATB ,await family decision further plan

## 2021-03-29 NOTE — PROGRESS NOTES
Coordination [x] Balance [x] Vision/perception [] Sensation []   Gross Motor Coordination [x] ROM [x] Delirium []                  Motor Control [x]    Plan of Care: 2-4 days/wk for 1-2 weeks PRN     Instruction/training on adapted ADL techniques and AE recommendations to increase functional independence within precautions  Functional transfer/mobility training/DME recommendations for increased independence, safety, and fall prevention  Patient/Family education to increase follow through with safety techniques and functional independence  Therapeutic exercise to improve motor endurance, ROM, and functional strength for ADLs/functional transfers  Therapeutic activities to facilitate/challenge dynamic balance, stand tolerance, fine motor dexterity/in-hand manipulation for increased independence with ADLs  Positioning to improve functional independence      Rehab Potential: Good for established goals     Patient / Family Goal:  not stated     Patient and/or family were instructed on functional diagnosis, prognosis/goals and OT plan of care. Demonstrated good understanding.      Low Evaluation complexity completed +     Time In: 4:12  Time Out: 4:39  Total Treatment Time: 23 minutes    Min Units   OT Eval Low 11207  X     OT Eval Medium 74017      OT Eval High 31427       OT Re-Eval [id number]       Therapeutic Ex 27872       Therapeutic Activities 47198  10  1   ADL/Self Care 80538  13  1   Orthotic Management 70720       Neuro Re-Ed 43300       Total Treatment Time 23 2       Evaluation time includes thorough review of current medical information, gathering information on past medical history/social history and prior level of function, completion of standardized testing/informal observation of tasks, assessment of data, and development of POC/Goals    Indy Arauz OTR/L #[id number]

## 2021-03-29 NOTE — CARE COORDINATION
Care Coordination-  The patient was admited due to multiple falls at home and she Lives with her granddaughter  In a splint level home with no stairs to get into the home. Bed and bath are on the second floor and the patient was independent pta. . I spoke to Mr Cleo Kenyon for dme she has a wc and a wheelchair at home. Her Pharmacy is [facility] and pcp is Omar Lynn. Pt is 12/24 however the family wants her home upon discharge as they are moving to [place] and she is also going. She was admitted due to a fall at home.  I will follow

## 2021-03-30 NOTE — ED PROVIDER NOTES
[facility] [address]  Department of Emergency Medicine     Written by: Randall Black DO  Patient Name: Mer Walker  Attending Provider: Elsie Flores DO  Admit Date: 3/30/2021  2:09 PM  MRN: [id number]                   : 1940        Chief Complaint   Patient presents with    Failure To Thrive     reported that patient was just d/c from here and was sent home where she lives with family,patient is weak and family unable to care for her. reported that patient is to fly with family to [facility]    - Chief complaint    HPI     This is an 78year old female who presents with weakness/failure to thrive. The patient was just discharged a few hours ago after an uncomplicated admission from falls and UTI. Apparently the patient was recently a hospice patient which was revoked to warrant inpatient hospitalization that past admission. The plan at discharge was apparently home; she lives with her granddaughter; and they are moving to [place] Monday and she is moving with them. However she presented hours after discharge secondary to weakness and family unable to care for her. She has no specific complaints; no more falls. She is conversant and answers all questions appropriately. She is not confused. Review of Systems   Constitutional: Negative for fatigue and fever. Respiratory: Negative for cough, shortness of breath and wheezing. Cardiovascular: Negative for chest pain, palpitations and leg swelling. Gastrointestinal: Negative for abdominal distention and abdominal pain. Genitourinary: Negative for dysuria and hematuria. Musculoskeletal: Negative for arthralgias, back pain, neck pain and neck stiffness. Neurological: Positive for weakness. Negative for numbness and headaches. All other systems reviewed and are negative. Physical Exam  Constitutional:       General: She is not in acute distress. Appearance: She is ill-appearing. HENT:      Head: Normocephalic. known allergies.     -------------------------------------------------- RESULTS -------------------------------------------------  Labs:  Results for orders placed or performed during the hospital encounter of 03/30/21   COVID-19, Rapid    Specimen: Nasopharyngeal Swab   Result Value Ref Range    SARS-CoV-2, NAAT Not Detected Not Detected   CBC Auto Differential   Result Value Ref Range    WBC 9.0 4.5 - 11.5 E9/L    RBC 4.86 3.50 - 5.50 E12/L    Hemoglobin 14.3 11.5 - 15.5 g/dL    Hematocrit 45.9 34.0 - 48.0 %    MCV 94.4 80.0 - 99.9 fL    MCH 29.4 26.0 - 35.0 pg    MCHC 31.2 (L) 32.0 - 34.5 %    RDW 14.4 11.5 - 15.0 fL    Platelets 540 818 - 599 E9/L    MPV 10.5 7.0 - 12.0 fL    Neutrophils % 63.5 43.0 - 80.0 %    Immature Granulocytes % 0.3 0.0 - 5.0 %    Lymphocytes % 25.3 20.0 - 42.0 %    Monocytes % 9.2 2.0 - 12.0 %    Eosinophils % 1.1 0.0 - 6.0 %    Basophils % 0.6 0.0 - 2.0 %    Neutrophils Absolute 5.71 1.80 - 7.30 E9/L    Immature Granulocytes # 0.03 E9/L    Lymphocytes Absolute 2.28 1.50 - 4.00 E9/L    Monocytes Absolute 0.83 0.10 - 0.95 E9/L    Eosinophils Absolute 0.10 0.05 - 0.50 E9/L    Basophils Absolute 0.05 0.00 - 0.20 E9/L   Comprehensive Metabolic Panel w/ Reflex to MG   Result Value Ref Range    Sodium 134 132 - 146 mmol/L    Potassium reflex Magnesium 4.9 3.5 - 5.0 mmol/L    Chloride 97 (L) 98 - 107 mmol/L    CO2 28 22 - 29 mmol/L    Anion Gap 9 7 - 16 mmol/L    Glucose 106 (H) 74 - 99 mg/dL    BUN 15 8 - 23 mg/dL    CREATININE 0.6 0.5 - 1.0 mg/dL    GFR Non-African American >60 >=60 mL/min/1.73    GFR African American >60     Calcium 10.3 (H) 8.6 - 10.2 mg/dL    Total Protein 7.7 6.4 - 8.3 g/dL    Albumin 3.9 3.5 - 5.2 g/dL    Total Bilirubin 0.4 0.0 - 1.2 mg/dL    Alkaline Phosphatase 118 (H) 35 - 104 U/L    ALT 18 0 - 32 U/L    AST 27 0 - 31 U/L       Radiology:  No orders to display       ------------------------- NURSING NOTES AND VITALS REVIEWED ---------------------------  Date / Time Roomed:  3/30/2021  2:09 PM  ED Bed Assignment:  LAWSON D/    The nursing notes within the ED encounter and vital signs as below have been reviewed. /84   Pulse 84   Temp 97.1 °F (36.2 °C) (Temporal)   Resp 16   Ht 5' 3\" (1.6 m)   Wt 90 lb (40.8 kg)   SpO2 98%   BMI 15.94 kg/m²   Oxygen Saturation Interpretation: Normal      ------------------------------------------ PROGRESS NOTES ------------------------------------------  7:36 PM EDT  I have spoken with the patient and discussed todays results, in addition to providing specific details for the plan of care and counseling regarding the diagnosis and prognosis. Their questions are answered at this time and they are agreeable with the plan. I discussed at length with them reasons for immediate return here for re evaluation. They will followup with their primary care physician by calling their office as needed. --------------------------------- ADDITIONAL PROVIDER NOTES ---------------------------------  At this time the patient is without objective evidence of an acute process requiring hospitalization or inpatient management. They have remained hemodynamically stable throughout their entire ED visit and are stable for discharge with outpatient follow-up. The plan has been discussed in detail and they are aware of the specific conditions for emergent return, as well as the importance of follow-up. Discharge Medication List as of 3/30/2021  7:12 PM          Diagnosis:  1. Failure to thrive in adult        Disposition:  Patient's disposition: Discharge to nursing home  Patient's condition is stable.        Sophie Brown DO  Resident  03/30/21 7315

## 2021-03-30 NOTE — PROGRESS NOTES
Jeannie Rivera is a 78 y.o. right handed female     Neurology is following for frequent falls    PMH: Polio with residual left-sided spastic weakness, hyperlipidemia    The patient presents after a fall at home. Per patient, has been falling frequently over the last 1.5 years. She has been living alone since her  passed away in 2/2021. Her family lives in [place] and the patient may be going down to stay with family in the next couple days. She has been receiving hospice care which was revoked for her to come to the hospital.    + UTI on UA. Patient also reports she has not been eating or drinking much. CT head showed no acute intracranial abnormality   At present time patient is resting quietly in bed, awake, alert and oriented x4. Vital signs are stable at present time    No family at bedside    Patient has no complaints currently    Objective:     BP (!) 151/82   Pulse 100   Temp 96.8 °F (36 °C) (Temporal)   Resp 16   Ht 5' 2\" (1.575 m)   Wt 92 lb (41.7 kg)   SpO2 95%   BMI 16.83 kg/m²     General appearance: alert, appears stated age, cooperative and no distress  Head: normocephalic, without obvious abnormality, atraumatic  Eyes: conjunctivae/corneas clear.  .  Neck: supple, symmetrical, trachea midline  Lungs: clear to auscultation bilaterally  Heart: regular rate and rhythm  Extremities: normal, atraumatic, no cyanosis or edema  Pulses: 2+ and symmetric  Skin: color, texture, turgor normal---no rashes or lesions      Mental Status: Alert and oriented x4, follows commands well, pleasant    Appropriate attention/concentration  Intact fundus of knowledge  Intact memories    Speech: Mild to moderate dysarthria  Language: no aphasias    Cranial Nerves:  I: smell NA   II: visual acuity  NA   II: visual fields Full to confrontation   II: pupils PERRL   III,VII: ptosis None   III,IV,VI: extraocular muscles  EOMI without nystagmus   V: mastication Normal   V: facial light touch sensation Normal   V,VII: corneal reflex     VII: facial muscle function - upper  Normal   VII: facial muscle function - lower  left lower facial droop noted   VIII: hearing Normal   IX: soft palate elevation  Normal   IX,X: gag reflex    XI: trapezius strength  5/5   XI: sternocleidomastoid strength 5/5   XI: neck extension strength  5/5   XII: tongue strength  Normal     Motor:  L spastic hemiparesis (chronic)   Increased tone BLE L > R   No drift  No abnormal movements     Sensory:  LT  normal     Coordination:   FN, FFM impaired on L   HS impaired b/l      DTR:   Hyperreflexic on L      L [place]is      No other pathological reflexes    Laboratory/Radiology:     CBC:   Lab Results   Component Value Date    WBC 9.2 03/28/2021    RBC 4.48 03/28/2021    HGB 13.4 03/28/2021    HCT 42.0 03/28/2021    MCV 93.8 03/28/2021    MCH 29.9 03/28/2021    MCHC 31.9 03/28/2021    RDW 14.4 03/28/2021     03/28/2021    MPV 9.4 03/28/2021     CMP:    Lab Results   Component Value Date     03/28/2021    K 4.8 03/28/2021    K 5.0 03/27/2021    CL 99 03/28/2021    CO2 22 03/28/2021    BUN 22 03/28/2021    CREATININE 0.8 03/28/2021    GFRAA >60 03/28/2021    LABGLOM >60 03/28/2021    GLUCOSE 143 03/28/2021    PROT 7.0 03/28/2021    LABALBU 4.0 03/28/2021    CALCIUM 9.3 03/28/2021    BILITOT 0.4 03/28/2021    ALKPHOS 123 03/28/2021    AST 31 03/28/2021    ALT 17 03/28/2021     U/A:    Lab Results   Component Value Date    COLORU Yellow 03/27/2021    PROTEINU Negative 03/27/2021    PHUR 6.5 03/27/2021    WBCUA 10-20 03/27/2021    RBCUA 2-5 03/27/2021    BACTERIA MANY 03/27/2021    CLARITYU Clear 03/27/2021    SPECGRAV >=1.030 03/27/2021    LEUKOCYTESUR MODERATE 03/27/2021    UROBILINOGEN 0.2 03/27/2021    BILIRUBINUR Negative 03/27/2021    BLOODU TRACE-INTACT 03/27/2021    GLUCOSEU Negative 03/27/2021     CT HEAD WO CONTRAST   Final Result   No acute intracranial abnormality.       Periventricular white matter changes consistent chronic microvascular   disease. Diffuse volume loss. XR HIP BILATERAL W AP PELVIS (2 VIEWS)   Final Result   No acute osseous abnormality is identified. 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 She is not using assistive devices regularly at home. Patient currently on hospice with plans to discharge this evening    UTI   On Rocephin    Plan:     Continue supportive care  Continue PT/OT  Up with assistance    Okay to discharge from neuro standpoint once medically cleared. Patient's family is taking her to [place] after discharge. Neuro will sign off.     JEANMARIE Fay NP-C   12:21 PM  3/30/2021

## 2021-03-30 NOTE — CARE COORDINATION
Social Work/ Discharge Planning:     Pt presents to the ED secondary to failure to thrive. Pt was discharged from this hospital earlier today but returned within hours from home via EMS. Pt revoked services with [facility] on 3/27/21 when she was admitted into this hospital.    BECKY spoke with Pts daughter-in-law Meño Hooper [phone]) and granddaughter Rosio Dixon [phone]) both via telephone. Pts granddaughter Rosio Dixon) states she picked up the Pt today from this hospital and Pt was non ambulatory, incontinent and extremely disoriented due to the Pt being given Xanax that was not a medication the Pt was typically prescribed. Pts granddaughter also states that the Pt is generally a good historian, but states the Pt relayed to her that she \"felt drunk\". Pts granddaughter also states that they did not receive the Pts cell phone back upon discharge. TAYLER Luna retrieved Pts phone today 3/30/21 and gave it to Pt personally. SW spoke to Pts daughter-in-law Meño Hooper) who confirmed that the Pt already has a plane ticket and is leaving to move, long term into a home next door to her and the Pts son (Connie) on 4/5/21. Pts daughter-in-law states she thinks the best place for the Pt is with them in [place] since the Pts  passed away 1/25/21. Pts daughter-in-law states that they already have hospice lined up for the Pt when she arrives in [place]. SW discussed the extend of Pts needs with Pts daughter-in-law and granddaughter. SW discussed discharge plans including [facility] placement. Family is in agreement and freedom of choice provided. Pts daughter-in-law would like referral to [address] [place] [address]. SW made referral to Arben Smyth [address]Manny with [address] [place] [address].

## 2021-03-30 NOTE — ED NOTES
Nurse to nurse report called to Terri Matos at [facility].       Minerva Lewis RN  03/30/21 3040

## 2021-03-31 NOTE — DISCHARGE SUMMARY
Date: 03/30/2021Value: 29.4        Ref range: 26.0 - 35.0 pg     Status: [address]                                          Date: 03/30/2021Value: 31.2*       Ref range: 32.0 - 34.5 %      Status: FinalRDW                                           Date: 03/30/2021Value: 14.4        Ref range: 11.5 - 15.0 fL     Status: FinalPlatelets                                     Date: 03/30/2021Value: 258         Ref range: 130 - 450 E9/L     Status: FinalMPV                                           Date: 03/30/2021Value: 10.5        Ref range: 7.0 - 12.0 fL      Status: FinalNeutrophils %                                 Date: 03/30/2021Value: 63.5        Ref range: 43.0 - 80.0 %      Status: FinalImmature Granulocytes %                       Date: 03/30/2021Value: 0.3         Ref range: 0.0 - 5.0 %        Status: FinalLymphocytes %                                 Date: 03/30/2021Value: 25.3        Ref range: 20.0 - 42.0 %      Status: FinalMonocytes %                                   Date: 03/30/2021Value: 9.2         Ref range: 2.0 - 12.0 %       Status: FinalEosinophils %                                 Date: 03/30/2021Value: 1.1         Ref range: 0.0 - 6.0 %        Status: FinalBasophils %                                   Date: 03/30/2021Value: 0.6         Ref range: 0.0 - 2.0 %        Status: FinalNeutrophils Absolute                          Date: 03/30/2021Value: 5.71        Ref range: 1.80 - 7.30 E9/L   Status: FinalImmature Granulocytes #                       Date: 03/30/2021Value: 0.03        Ref range: E9/L               Status: FinalLymphocytes Absolute                          Date: 03/30/2021Value: 2.28        Ref range: 1.50 - 4.00 E9/L   Status: FinalMonocytes Absolute                            Date: 03/30/2021Value: 0.83        Ref range: 0.10 - 0.95 E9/L   Status: FinalEosinophils Absolute                          Date: 03/30/2021Value: 0.10        Ref range: 0.05 - 0.50 E9/L   Status: FinalBasophils Absolute                            Date: 03/30/2021Value: 0.05        Ref range: 0.00 - 0.20 E9/L   Status: FinalSodium                                        Date: 03/30/2021Value: 134         Ref range: 132 - 146 mmol/L   Status: FinalPotassium reflex Magnesium                    Date: 03/30/2021Value: 4.9         Ref range: 3.5 - 5.0 mmol/L   Status: FinalChloride                                      Date: 03/30/2021Value: 97*         Ref range: 98 - 107 mmol/L    Status: FinalCO2                                           Date: 03/30/2021Value: 28          Ref range: 22 - 29 mmol/L     Status: FinalAnion Gap                                     Date: 03/30/2021Value: 9           Ref range: 7 - 16 mmol/L      Status: FinalGlucose                                       Date: 03/30/2021Value: 106*        Ref range: 74 - 99 mg/dL      Status: FinalBUN                                           Date: 03/30/2021Value: 15          Ref range: 8 - 23 mg/dL       Status: FinalCREATININE                                    Date: 03/30/2021Value: 0.6         Ref range: 0.5 - 1.0 mg/dL    Status: FinalGFR Non-                      Date: 03/30/2021Value: >60         Ref range: >=60 mL/min/1.73   Status: Final              Comment: Chronic Kidney Disease: less than 60 ml/min/1.73 sq.m. Kidney Failure: less than 15 ml/min/1.73 sq. m. Results valid for patients 18 years and older. GFR                           Date: 03/30/2021Value: >60           Status: FinalCalcium                                       Date: 03/30/2021Value: 10.3*       Ref range: 8.6 - 10.2 mg/dL   Status: FinalTotal Protein                                 Date: 03/30/2021Value: 7.7         Ref range: 6.4 - 8.3 g/dL     Status: FinalAlbumin                                       Date: 03/30/2021Value: 3.9         Ref range: 3.5 - 5.2 g/dL     Status: FinalTotal Bilirubin Date: 03/30/2021Value: 0.4         Ref range: 0.0 - 1.2 mg/dL    Status: FinalAlkaline Phosphatase                          Date: 03/30/2021Value: 118*        Ref range: 35 - 104 U/L       Status: FinalALT                                           Date: 03/30/2021Value: 18          Ref range: 0 - 32 U/L         Status: FinalAST                                           Date: 03/30/2021Value: 27          Ref range: 0 - 31 U/L         Status: [id number]-1, NAAT                              Date: 03/30/2021Value: Not Detected                   Ref range: Not Detected       Status: Final              Comment: Rapid NAAT:   Negative results should be treated as presumptive and,if inconsistent with clinical signs and symptoms or necessary forpatient management, should be tested with an alternative molecularassay. Negative results do not preclude SARS-CoV-2 infection andshould not be used as the sole basis for patient management decisions. This test has been authorized by the FDA under an Emergency UseAuthorization (EUA) for use by authorized laboratories. Fact sheet for Healthcare [employer] sheet for Patients: KissJose[employer]dk: Isothermal Nucleic Acid Amplification------------Admission on 03/27/2021, Discharged on 03/30/2021WBC                                           Date: 03/27/2021Value: 13.6*       Ref range: 4.5 - 11.5 E9/L    Status: FinalRBC                                           Date: 03/27/2021Value: 4.54        Ref range: 3.50 - 5.50 E12/L  Status: FinalHemoglobin                                    Date: 03/27/2021Value: 13.4        Ref range: 11.5 - 15.5 g/dL   Status: FinalHematocrit                                    Date: 03/27/2021Value: 42.4        Ref range: 34.0 - 48.0 %      Status: FinalMCV                                           Date: 03/27/2021Value: 93.4        Ref range: 80.0 - 99.9 fL     Status: [facility] Date: 03/27/2021Value: 29.5        Ref range: 26.0 - 35.0 pg     Status: [address]                                          Date: 03/27/2021Value: 31.6*       Ref range: 32.0 - 34.5 %      Status: FinalRDW                                           Date: 03/27/2021Value: 14.3        Ref range: 11.5 - 15.0 fL     Status: FinalPlatelets                                     Date: 03/27/2021Value: 260         Ref range: 130 - 450 E9/L     Status: FinalMPV                                           Date: 03/27/2021Value: 9.5         Ref range: 7.0 - 12.0 fL      Status: FinalNeutrophils %                                 Date: 03/27/2021Value: 87.6*       Ref range: 43.0 - 80.0 %      Status: FinalImmature Granulocytes %                       Date: 03/27/2021Value: 0.4         Ref range: 0.0 - 5.0 %        Status: FinalLymphocytes %                                 Date: 03/27/2021Value: 5.5*        Ref range: 20.0 - 42.0 %      Status: FinalMonocytes %                                   Date: 03/27/2021Value: 5.7         Ref range: 2.0 - 12.0 %       Status: FinalEosinophils %                                 Date: 03/27/2021Value: 0.5         Ref range: 0.0 - 6.0 %        Status: FinalBasophils %                                   Date: 03/27/2021Value: 0.3         Ref range: 0.0 - 2.0 %        Status: FinalNeutrophils Absolute                          Date: 03/27/2021Value: 11.91*      Ref range: 1.80 - 7.30 E9/L   Status: FinalImmature Granulocytes #                       Date: 03/27/2021Value: 0.06        Ref range: E9/L               Status: FinalLymphocytes Absolute                          Date: 03/27/2021Value: 0.75*       Ref range: 1.50 - 4.00 E9/L   Status: FinalMonocytes Absolute                            Date: 03/27/2021Value: 0.77        Ref range: 0.10 - 0.95 E9/L   Status: FinalEosinophils Absolute                          Date: 03/27/2021Value: 0.07        Ref range: 0.05 Date: 03/27/2021Value: 0.2         Ref range: 0.0 - 1.2 mg/dL    Status: FinalAlkaline Phosphatase                          Date: 03/27/2021Value: 119*        Ref range: 35 - 104 U/L       Status: FinalALT                                           Date: 03/27/2021Value: 15          Ref range: 0 - 32 U/L         Status: FinalAST                                           Date: 03/27/2021Value: 27          Ref range: 0 - 31 U/L         Status: FinalVentricular Rate                              Date: 03/27/2021Value: 103         Ref range: BPM                Status: FinalAtrial Rate                                   Date: 03/27/2021Value: 103         Ref range: BPM                Status: FinalP-R Interval                                  Date: 03/27/2021Value: 174         Ref range: ms                 Status: FinalQRS Duration                                  Date: 03/27/2021Value: 60          Ref range: ms                 Status: FinalQ-T Interval                                  Date: 03/27/2021Value: 352         Ref range: ms                 Status: FinalQTc Calculation (Bazett)                      Date: 03/27/2021Value: 461         Ref range: ms                 Status: FinalP Axis                                        Date: 03/27/2021Value: 84          Ref range: degrees            Status: FinalR Axis                                        Date: 03/27/2021Value: 11          Ref range: degrees            Status: FinalT Axis                                        Date: 03/27/2021Value: 78          Ref range: degrees            Status: FinalTroponin                                      Date: 03/27/2021Value: <0.01       Ref range: 0.00 - 0.03 ng/mL  Status: Final              Comment: TROPONIN T BLOOD LEVELS:       0.03 ng/mL     Upper Reference Limit0. 04 - 0.09 ng/mL     Possible myocardial injury    >= 0.10 ng/mL     Myocardial dugeffBMHP-PdZ-2, NAAT                              Date: 03/27/2021Value: Not range:  1.005 - 1.030      Status: FinalBlood, Urine                                  Date: 03/27/2021Value: TRACE-INTACT                   Ref range: Negative           Status: FinalpH, UA                                        Date: 03/27/2021Value: 6.5         Ref range: 5.0 - 9.0          Status: FinalProtein, UA                                   Date: 03/27/2021Value: Negative    Ref range: Negative mg/dL     Status: FinalUrobilinogen, Urine                           Date: 03/27/2021Value: 0.2         Ref range: <2.0 E.U./dL       Status: FinalNitrite, Urine                                Date: 03/27/2021Value: Negative    Ref range: Negative           Status: FinalLeukocyte Esterase, Urine                     Date: 03/27/2021Value: MODERATE*   Ref range: Negative           Status: [address] [place] [address], UA                                       Date: 03/27/2021Value: 10-20*      Ref range: 0 - 5 /HPF         Status: FinalRBC, UA                                       Date: 03/27/2021Value: 2-5         Ref range: 0 - 2 /HPF         Status: FinalEpithelial Cells, UA                          Date: 03/27/2021Value: MODERATE    Ref range: /HPF               Status: FinalRenal Epithelial, UA                          Date: 03/27/2021Value: RARE        Ref range: /HPF               Status: FinalBacteria, UA                                  Date: 03/27/2021Value: MANY*       Ref range: None Seen /HPF     Status: [address] [place] [address]                                           Date: 03/28/2021Value: 9.2         Ref range: 4.5 - 11.5 E9/L    Status: FinalRBC                                           Date: 03/28/2021Value: 4.48        Ref range: 3.50 - 5.50 E12/L  Status: FinalHemoglobin                                    Date: 03/28/2021Value: 13.4        Ref range: 11.5 - 15.5 g/dL   Status: FinalHematocrit                                    Date: 03/28/2021Value: 42.0        Ref range: 34.0 - 48.0 %      Status: Z[place] Miss ml/min/1.73 sq. m. Results valid for patients 18 years and older. GFR                           Date: 03/28/2021Value: >60           Status: FinalCalcium                                       Date: 03/28/2021Value: 9.3         Ref range: 8.6 - 10.2 mg/dL   Status: FinalTotal Protein                                 Date: 03/28/2021Value: 7.0         Ref range: 6.4 - 8.3 g/dL     Status: FinalAlbumin                                       Date: 03/28/2021Value: 4.0         Ref range: 3.5 - 5.2 g/dL     Status: FinalTotal Bilirubin                               Date: 03/28/2021Value: 0.4         Ref range: 0.0 - 1.2 mg/dL    Status: FinalAlkaline Phosphatase                          Date: 03/28/2021Value: 123*        Ref range: 35 - 104 U/L       Status: FinalALT                                           Date: 03/28/2021Value: 17          Ref range: 0 - 32 U/L         Status: FinalAST                                           Date: 03/28/2021Value: 31          Ref range: 0 - 31 U/L         Status: Final------------    Radiology last 7 days:  Xr Hip Bilateral W Ap Pelvis (2 Views)Result Date: 3/27/2021No acute osseous abnormality is identified. If there are persistent symptoms, follow-up radiograph or CT could be obtained to exclude occult fracture Ct Head Wo ContrastResult Date: 3/28/2021No acute intracranial abnormality. Periventricular white matter changes consistent chronic microvascular disease. Diffuse volume loss. Pending Labs   Order Current Status  Culture, Urine Collected (03/27/21 0846)  Culture, Blood 1 Preliminary result  Culture, Blood 2 Preliminary result      Discharge Medications    Discharge Medication List as of 3/30/2021 12:05 PM    Discharge Medication List as of 3/30/2021 12:05 PM    Discharge Medication List as of 3/30/2021 12:05 PMCONTINUE these medications which have NOT CHANGEDALPRAZolam (XANAX) 0.25 MG tabletTake 0.25 mg by mouth 2 times daily. Historical Medpravastatin (PRAVACHOL) 10 MG tabletTake 10 mg by mouth daily    Discharge Medication List as of 3/30/2021 12:05 PMSTOP taking these medicationsacetaminophen (APAP EXTRA STRENGTH) 500 MG tabletComments:Reason for Stopping:HYDROcodone-acetaminophen ([address]) 5-325 MG per tabletComments:Reason for Stopping:    Time Spent on Discharge:3E] minutes were spent in patient examination, evaluation, counseling as well as medication reconciliation, prescriptions for required medications, discharge plan, and follow up.     Electronically signed by Mica Espinoza MD on 3/31/21 at 8:17 AM EDT